# Patient Record
Sex: FEMALE | Race: WHITE | NOT HISPANIC OR LATINO | Employment: FULL TIME | URBAN - METROPOLITAN AREA
[De-identification: names, ages, dates, MRNs, and addresses within clinical notes are randomized per-mention and may not be internally consistent; named-entity substitution may affect disease eponyms.]

---

## 2017-09-15 ENCOUNTER — OFFICE VISIT (OUTPATIENT)
Dept: FAMILY MEDICINE | Facility: CLINIC | Age: 47
End: 2017-09-15
Payer: COMMERCIAL

## 2017-09-15 VITALS
TEMPERATURE: 98.6 F | WEIGHT: 184.4 LBS | HEART RATE: 80 BPM | BODY MASS INDEX: 29.63 KG/M2 | DIASTOLIC BLOOD PRESSURE: 80 MMHG | SYSTOLIC BLOOD PRESSURE: 120 MMHG | HEIGHT: 66 IN

## 2017-09-15 DIAGNOSIS — Z23 NEED FOR VACCINATION: ICD-10-CM

## 2017-09-15 DIAGNOSIS — Z23 NEED FOR PROPHYLACTIC VACCINATION AND INOCULATION AGAINST INFLUENZA: ICD-10-CM

## 2017-09-15 DIAGNOSIS — N95.1 PERIMENOPAUSE: Primary | ICD-10-CM

## 2017-09-15 DIAGNOSIS — Z13.6 CARDIOVASCULAR SCREENING; LDL GOAL LESS THAN 160: ICD-10-CM

## 2017-09-15 LAB
CHOLEST SERPL-MCNC: 160 MG/DL
HDLC SERPL-MCNC: 43 MG/DL
LDLC SERPL CALC-MCNC: 96 MG/DL
NONHDLC SERPL-MCNC: 117 MG/DL
TRIGL SERPL-MCNC: 107 MG/DL

## 2017-09-15 PROCEDURE — 36415 COLL VENOUS BLD VENIPUNCTURE: CPT | Performed by: FAMILY MEDICINE

## 2017-09-15 PROCEDURE — 90472 IMMUNIZATION ADMIN EACH ADD: CPT | Performed by: FAMILY MEDICINE

## 2017-09-15 PROCEDURE — 90715 TDAP VACCINE 7 YRS/> IM: CPT | Performed by: FAMILY MEDICINE

## 2017-09-15 PROCEDURE — 90471 IMMUNIZATION ADMIN: CPT | Performed by: FAMILY MEDICINE

## 2017-09-15 PROCEDURE — 99213 OFFICE O/P EST LOW 20 MIN: CPT | Mod: 25 | Performed by: FAMILY MEDICINE

## 2017-09-15 PROCEDURE — 80061 LIPID PANEL: CPT | Performed by: FAMILY MEDICINE

## 2017-09-15 PROCEDURE — 90686 IIV4 VACC NO PRSV 0.5 ML IM: CPT | Performed by: FAMILY MEDICINE

## 2017-09-15 ASSESSMENT — ANXIETY QUESTIONNAIRES
7. FEELING AFRAID AS IF SOMETHING AWFUL MIGHT HAPPEN: NOT AT ALL
1. FEELING NERVOUS, ANXIOUS, OR ON EDGE: NOT AT ALL
GAD7 TOTAL SCORE: 2
6. BECOMING EASILY ANNOYED OR IRRITABLE: SEVERAL DAYS
3. WORRYING TOO MUCH ABOUT DIFFERENT THINGS: SEVERAL DAYS
2. NOT BEING ABLE TO STOP OR CONTROL WORRYING: NOT AT ALL
5. BEING SO RESTLESS THAT IT IS HARD TO SIT STILL: NOT AT ALL

## 2017-09-15 ASSESSMENT — PATIENT HEALTH QUESTIONNAIRE - PHQ9
5. POOR APPETITE OR OVEREATING: NOT AT ALL
SUM OF ALL RESPONSES TO PHQ QUESTIONS 1-9: 2

## 2017-09-15 NOTE — LETTER
September 18, 2017      Kayley Godoy  [address] [facility] [place] [address]        Dear ,    We are writing to inform you of your test results.    Your cholesterol looks good.  Your HDL (good) cholesterol was mildly low.  The best way to increase this is with regular exercise.  We should check again in a few years.    Resulted Orders   Lipid panel reflex to direct LDL   Result Value Ref Range    Cholesterol 160 <200 mg/dL    Triglycerides 107 <150 mg/dL    HDL Cholesterol 43 (L) >49 mg/dL    LDL Cholesterol Calculated 96 <100 mg/dL      Comment:      Desirable:       <100 mg/dl    Non HDL Cholesterol 117 <130 mg/dL       If you have any questions or concerns, please call the clinic at the number listed above.       Sincerely,        Steph Pedraza, DO

## 2017-09-15 NOTE — PROGRESS NOTES
SUBJECTIVE:   Kayley Godoy is a 46 year old female who presents to clinic today for the following health issues:    * hot flashes  * mood swings  * irregular bleeding    Patient presents today to discuss symptoms of perimenopause. States she's been having symptoms for at least the last year. Symptoms consist of mood swings that seem to occur on a cyclic basis. At these times she notes that she is more emotional and irritable and sometimes tearful. She feels the symptoms last for approximately one week. She feels they are manageable now that she is more aware of them.    She has also been having hot flashes and night sweats. Symptoms seem to be more frequent at night but she feels she continues to get adequate sleep.    She is not had regular menses since her ablation in 2008. She has had periodic scans spotting most recently had one day of very light flow in August she is not had any vaginal bleeding since.    She presents to discuss today because she's not sure if she needs to be doing anything regarding the symptoms.    She currently feels symptoms are manageable and is not interested in medication.      Problem list and histories reviewed & adjusted, as indicated.  Additional history: as documented      Reviewed and updated as needed this visit by clinical staff  Tobacco  Allergies  Meds  Problems  Med Hx  Surg Hx  Fam Hx  Soc Hx        Reviewed and updated as needed this visit by Provider  Tobacco  Allergies  Meds  Problems  Med Hx  Surg Hx  Fam Hx  Soc Hx          ROS: Remainder of Constitutional, CV, Respiratory, GI,  negative with exception of that mentioned above    PE:  VS as above   Gen:  WN/WD/WH female in NAD   Psych: Alert and oriented times 3; coherent speech, normal   rate and volume, able to articulate logical thoughts, able   to abstract reason, no tangential thoughts, no hallucinations   or delusions  Her affect is bright and appropriate    A/p:      ICD-10-CM    1. Perimenopause  N95.1    2. CARDIOVASCULAR SCREENING; LDL GOAL LESS THAN 160 Z13.6 Lipid panel reflex to direct LDL     reviewed with patient option for managing vasomotor symptoms of menopause including combination hormone replacement therapy, herbal supplements, and alternative medications such as venlafaxine, paroxetine, and gabapentin.    Also reviewed need for follow-up bleeding increases significantly or become significantly more frequent    Patient chooses to observe symptoms for now and will follow-up as needed.    Patient Instructions   We talked about some options today for managing perimenopausal symptoms. Please let me know if you're interested in considering any of these options.    If you're bleeding increases or you experiencing frequent vaginal bleeding please let me know.  We updated your tetanus and flu shot in clinic today    I will let you know your cholesterol results when available    I will see you back in the spring for your physical

## 2017-09-15 NOTE — MR AVS SNAPSHOT
After Visit Summary   9/15/2017    Kayley Godoy    MRN: [id number]           Patient Information     Date Of Birth          1970        Visit Information        Provider Department      9/15/2017 8:40 AM Steph Pedraza DO [facility]        Today's Diagnoses     CARDIOVASCULAR SCREENING; LDL GOAL LESS THAN 160    -  1      Care Instructions    We talked about some options today for managing perimenopausal symptoms. Please let me know if you're interested in considering any of these options.    If you're bleeding increases or you experiencing frequent vaginal bleeding please let me know.  We updated your tetanus and flu shot in clinic today    I will let you know your cholesterol results when available    I will see you back in the spring for your physical            Follow-ups after your visit        Who to contact     Normal or non-critical lab and imaging results will be communicated to you by [facility], letter or phone within 4 business days after the clinic has received the results. If you do not hear from us within 7 days, please contact the clinic through [facility] or phone. If you have a critical or abnormal lab result, we will notify you by phone as soon as possible.  Submit refill requests through [facility] or call your pharmacy and they will forward the refill request to us. Please allow 3 business days for your refill to be completed.          If you need to speak with a  for additional information , please call: [phone]           Additional Information About Your Visit        [facility] Information     [facility] gives you secure access to your electronic health record. If you see a primary care provider, you can also send messages to your care team and make appointments. If you have questions, please call your primary care clinic.  If you do not have a primary care provider, please call [phone] and they will assist you.        Care EveryWhere ID      "This is your Care EveryWhere ID. This could be used by other organizations to access your [facility] medical records  [id number]        Your Vitals Were     Pulse Temperature Height Breastfeeding? BMI (Body Mass Index)       80 98.6  F (37  C) (Tympanic) 5' 5.75\" (1.67 m) No 29.99 kg/m2        Blood Pressure from Last 3 Encounters:   09/15/17 120/80   12/12/16 118/76   10/13/16 100/66    Weight from Last 3 Encounters:   09/15/17 184 lb 6.4 oz (83.6 kg)   12/12/16 183 lb (83 kg)   10/13/16 184 lb 6.4 oz (83.6 kg)              We Performed the Following     Lipid panel reflex to direct LDL        Primary Care Provider Office Phone # Fax #    Steph Priest DO [place] [phone] [address] [place] [address]        Equal Access to Services     SUSHANT SANTIZO : Hadii donna parr Sojoey, waaxda luqadaha, qaybta kaalmada adedixieyada, axel neville . So [facility] [phone].    ATENCIÓN: Si habla español, tiene a neil disposición servicios gratuitos de asistencia lingüística. Llame al [phone].    We comply with applicable federal civil rights laws and [place] laws. We do not discriminate on the basis of race, color, national origin, age, disability sex, sexual orientation or gender identity.            Thank you!     Thank you for choosing [facility] [place]  for your care. Our goal is always to provide you with excellent care. Hearing back from our patients is one way we can continue to improve our services. Please take a few minutes to complete the written survey that you may receive in the mail after your visit with us. Thank you!             Your Updated Medication List - Protect others around you: Learn how to safely use, store and throw away your medicines at www.disposemymeds.org.          This list is accurate as of: 9/15/17  9:09 AM.  Always use your most recent med list.                   Brand Name Dispense Instructions for use Diagnosis    ibuprofen " 200 MG tablet    ADVIL/MOTRIN     AS NEEDED        NO ACTIVE MEDICATIONS           omeprazole 20 MG tablet      Take 20 mg by mouth daily as needed

## 2017-09-15 NOTE — LETTER
My Depression Action Plan  Name: Kayley Godoy   Date of Birth 1970  Date: 9/15/2017    My doctor: Steph Pedraza   My clinic: [address] [address] [phone]          GREEN    ZONE   Good Control    What it looks like:     Things are going generally well. You have normal up s and down s. You may even feel depressed from time to time, but bad moods usually last less than a day.   What you need to do:  1. Continue to care for yourself (see self care plan)  2. Check your depression survival kit and update it as needed  3. Follow your physician s recommendations including any medication.  4. Do not stop taking medication unless you consult with your physician first.           YELLOW         ZONE Getting Worse    What it looks like:     Depression is starting to interfere with your life.     It may be hard to get out of bed; you may be starting to isolate yourself from others.    Symptoms of depression are starting to last most all day and this has happened for several days.     You may have suicidal thoughts but they are not constant.   What you need to do:     1. Call your care team, your response to treatment will improve if you keep your care team informed of your progress. Yellow periods are signs an adjustment may need to be made.     2. Continue your self-care, even if you have to fake it!    3. Talk to someone in your support network    4. Open up your depression survival kit           RED    ZONE Medical Alert - Get Help    What it looks like:     Depression is seriously interfering with your life.     You may experience these or other symptoms: You can t get out of bed most days, can t work or engage in other necessary activities, you have trouble taking care of basic hygiene, or basic responsibilities, thoughts of suicide or death that will not go away, self-injurious behavior.     What you need to do:  1. Call your care team and  request a same-day appointment. If they are not available (weekends or after hours) call your local crisis line, emergency room or 911.      Electronically signed by: Amanda Alexis, September 15, 2017    Depression Self Care Plan / Survival Kit    Self-Care for Depression  Here s the deal. Your body and mind are really not as separate as most people think.  What you do and think affects how you feel and how you feel influences what you do and think. This means if you do things that people who feel good do, it will help you feel better.  Sometimes this is all it takes.  There is also a place for medication and therapy depending on how severe your depression is, so be sure to consult with your medical provider and/ or Behavioral Health Consultant if your symptoms are worsening or not improving.     In order to better manage my stress, I will:    Exercise  Get some form of exercise, every day. This will help reduce pain and release endorphins, the  feel good  chemicals in your brain. This is almost as good as taking antidepressants!  This is not the same as joining a gym and then never going! (they count on that by the way ) It can be as simple as just going for a walk or doing some gardening, anything that will get you moving.      Hygiene   Maintain good hygiene (Get out of bed in the morning, Make your bed, Brush your teeth, Take a shower, and Get dressed like you were going to work, even if you are unemployed).  If your clothes don't fit try to get ones that do.    Diet  I will strive to eat foods that are good for me, drink plenty of water, and avoid excessive sugar, caffeine, alcohol, and other mood-altering substances.  Some foods that are helpful in depression are: complex carbohydrates, B vitamins, flaxseed, fish or fish oil, fresh fruits and vegetables.    Psychotherapy  I agree to participate in Individual Therapy (if recommended).    Medication  If prescribed medications, I agree to take them.  Missing  doses can result in serious side effects.  I understand that drinking alcohol, or other illicit drug use, may cause potential side effects.  I will not stop my medication abruptly without first discussing it with my provider.    Staying Connected With Others  I will stay in touch with my friends, family members, and my primary care provider/team.    Use your imagination  Be creative.  We all have a creative side; it doesn t matter if it s oil painting, sand castles, or mud pies! This will also kick up the endorphins.    Witness Beauty  (AKA stop and smell the roses) Take a look outside, even in mid-winter. Notice colors, textures. Watch the squirrels and birds.     Service to others  Be of service to others.  There is always someone else in need.  By helping others we can  get out of ourselves  and remember the really important things.  This also provides opportunities for practicing all the other parts of the program.    Humor  Laugh and be silly!  Adjust your TV habits for less news and crime-drama and more comedy.    Control your stress  Try breathing deep, massage therapy, biofeedback, and meditation. Find time to relax each day.     My support system    Clinic Contact:  Phone number:    Contact 1:  Phone number:    Contact 2:  Phone number:    M[employer]on/:  Phone number:    Therapist:  Phone number:    Local crisis center:    Phone number:    Other community support:  Phone number:

## 2017-09-15 NOTE — PATIENT INSTRUCTIONS
We talked about some options today for managing perimenopausal symptoms. Please let me know if you're interested in considering any of these options.    If you're bleeding increases or you experiencing frequent vaginal bleeding please let me know.  We updated your tetanus and flu shot in clinic today    I will let you know your cholesterol results when available    I will see you back in the spring for your physical

## 2017-09-15 NOTE — PROGRESS NOTES
Injectable Influenza Immunization Documentation    1.  Are you sick today? (Fever of 100.5 or higher on the day of the clinic)   No    2.  Have you ever had Guillain-Basco Syndrome within 6 weeks of an influenza vaccionation?  No    3. Do you have a life-threatening allergy to eggs?  No    4. Do you have a life-threatening allergy to a component of the vaccine? May include antibiotics, gelatin or latex.  No     5. Have you ever had a reaction to a dose of flu vaccine that needed immediate medical attention?  No     Form completed by Kayley Buckner CMA

## 2017-09-15 NOTE — NURSING NOTE
"Initial /80  Pulse 80  Temp 98.6  F (37  C) (Tympanic)  Ht 5' 5.75\" (1.67 m)  Wt 184 lb 6.4 oz (83.6 kg)  Breastfeeding? No  BMI 29.99 kg/m2 Estimated body mass index is 29.99 kg/(m^2) as calculated from the following:    Height as of this encounter: 5' 5.75\" (1.67 m).    Weight as of this encounter: 184 lb 6.4 oz (83.6 kg). .      "

## 2017-09-16 ASSESSMENT — ANXIETY QUESTIONNAIRES: GAD7 TOTAL SCORE: 2

## 2018-06-23 ENCOUNTER — APPOINTMENT (OUTPATIENT)
Dept: GENERAL RADIOLOGY | Facility: CLINIC | Age: 48
End: 2018-06-23
Attending: PHYSICIAN ASSISTANT
Payer: COMMERCIAL

## 2018-06-23 ENCOUNTER — HOSPITAL ENCOUNTER (EMERGENCY)
Facility: CLINIC | Age: 48
Discharge: HOME | End: 2018-06-23
Attending: PHYSICIAN ASSISTANT | Admitting: PHYSICIAN ASSISTANT
Payer: COMMERCIAL

## 2018-06-23 VITALS — TEMPERATURE: 98.5 F | DIASTOLIC BLOOD PRESSURE: 78 MMHG | OXYGEN SATURATION: 99 % | SYSTOLIC BLOOD PRESSURE: 128 MMHG

## 2018-06-23 DIAGNOSIS — S90.31XA CONTUSION OF RIGHT FOOT, INITIAL ENCOUNTER: ICD-10-CM

## 2018-06-23 PROCEDURE — 99213 OFFICE O/P EST LOW 20 MIN: CPT | Mod: Z6 | Performed by: PHYSICIAN ASSISTANT

## 2018-06-23 PROCEDURE — G0463 HOSPITAL OUTPT CLINIC VISIT: HCPCS | Performed by: PHYSICIAN ASSISTANT

## 2018-06-23 PROCEDURE — 73630 X-RAY EXAM OF FOOT: CPT | Mod: RT

## 2018-06-23 NOTE — DISCHARGE INSTRUCTIONS
Foot Contusion  You have a contusion. This is also called a bruise. There is swelling and some bleeding under the skin, but no broken bones. This injury generally takes a few days to a few weeks to heal.  During that time, the bruise will typically change in color from reddish, to purple-blue, to greenish-yellow, then to yellow-brown.  Home care    Elevate the foot to reduce pain and swelling. As much as possible, sit or lie down with the foot raised about the level of your heart. This is especially important during the first 48 hours.    Ice the foot to help reduce pain and swelling. Wrap a cold source (ice pack or ice cubes in a plastic bag) in a thin towel. Apply to the bruised area for 20 minutes every 1 to 2 hours the first day. Continue this 3 to 4 times a day until the pain and swelling goes away.    Unless another medicine was prescribed, you can take acetaminophen, ibuprofen, or naproxen to control pain. (If you have chronic liver or kidney disease or ever had a stomach ulcer or gastrointestinal bleeding, talk with your healthcare provider before using these medicines.)  Follow up  Follow up with your healthcare provider or our staff as advised. Call if you are not improving within 1 to 2 weeks.  When to seek medical advice   Call your healthcare provider right away if you have any of the following:    Increased pain or swelling    Foot or leg becomes cold, blue, numb or tingly    Signs of infection: Warmth, drainage, or increased redness or pain around the bruise    Inability to move the injured foot     Frequent bruising for unknown reasons  Date Last Reviewed: 2/1/2017 2000-2017 [employer]. [address] [place] [address], [place] [address]. All rights reserved. This information is not intended as a substitute for professional medical care. Always follow your healthcare professional's instructions.

## 2018-06-23 NOTE — ED AVS SNAPSHOT
[facility] Emergency Department    [address] [address]    Phone:  [phone]    Fax:  [phone]                                       Kayley Godoy   MRN: [id number]    Department:  [facility] Emergency Department   Date of Visit:  6/23/2018           After Visit Summary Signature Page     I have received my discharge instructions, and my questions have been answered. I have discussed any challenges I see with this plan with the nurse or doctor.    ..........................................................................................................................................  Patient/Patient Representative Signature      ..........................................................................................................................................  Patient Representative Print Name and Relationship to Patient    ..................................................               ................................................  Date                                            Time    ..........................................................................................................................................  Reviewed by Signature/Title    ...................................................              ..............................................  Date                                                            Time

## 2018-06-23 NOTE — ED PROVIDER NOTES
"  History     Chief Complaint   Patient presents with     Foot Pain     HPI  Kayley Godoy is a 47 year old female who is in the urgent care with right foot pain after injury earlier today.  Just prior to arrival patient was working in the yard wearing flip flops when she knocked over a small cement table which landed directly on her foot.  She had sudden onset of swelling, pain.  She additionally complains of intermittent paresthesias.  Does not have any other areas of discomfort.  No over overlying lacerations, abrasions or skin changes.  She has attempted to treat with ibuprofen without improvement.      Problem List:    Patient Active Problem List    Diagnosis Date Noted     Dyspepsia 03/23/2016     Priority: Medium     Dyspepsia and other specified disorders of function of stomach 09/02/2014     Priority: Medium     Anxiety 03/01/2012     Priority: Medium     24 hour contact handout given 02/13/2012     Priority: Medium     EMERGENCY CARE PLAN  Presenting Problem Signs and Symptoms Treatment Plan    Questions or conerns during clinic hours    I will call the clinic directly     Questions or conerns outside clinic hours    I will call the 24 hour nurse line at [phone]    Patient needs to schedule an appointment    I will call the 24 hour scheduling team at [phone] or clinic directly    Same day treatment     I will call the clinic first, nurse line if after hours, urgent care and express care if needed                                 Dysthymia 02/13/2012     Priority: Medium     ANAND 3 05/19/2011     Priority: Medium     Distant h/o abnormal pap, around age 18.  Had treatment with \"laser\".  Believes it to have been a CIN3 lesion.  Had repeat biopsies and all normal follow up  4/29/16: Pap - ASCUS, Neg HPV. Plan cotest in 3 years.        CARDIOVASCULAR SCREENING; LDL GOAL LESS THAN 160 10/31/2010     Priority: Medium     Intramural leiomyoma of uterus 01/08/2008     Priority: Medium     ATYPICAL " "MELANOCYTIC NEVUS 03/19/2005     Priority: Medium     3/19/05 Changing mole left shoulder. Biopsied by Dr. Mesa @ [facility].  4/4/05 Dr. Degroot @ [facility]: returning for removal of remnants of lesion. Prev path showed atypical melanocytic nevus.   4/6/05 Path shows no residual atypical melanocyte proliferation.        BILATERAL KNEE PAIN 02/04/2005     Priority: Medium     1/13/05 Dr. Akbar @ [facility]: Duration 4m. Plan MRI.  2/4/05 Dr. Mesa: Daily pain, R>L, feels hot to touch. Prev MRI shows some early meniscal changes in both knees, medical meniscus posterior horn. Advised glucosamine/ chondroitin.       Decreased libido 02/04/2005     Priority: Medium     2/4/05 Dr. Mesa: advised try to incr frequency of intimacies to incr receptiveness.          Past Medical History:    Past Medical History:   Diagnosis Date     Abnormal Pap smear of cervix \"In her 20's\"     ASCUS favor benign 4/29/16     Past Surgical History:    Past Surgical History:   Procedure Laterality Date     C APPENDECTOMY  age 13     CL AFF SURGICAL PATHOLOGY  1993     SURGICAL HISTORY OF -   2005    mole removal left upper chest     SURGICAL HISTORY OF -   2008    endometrial ablation     Family History:    Family History   Problem Relation Age of Onset     Cerebrovascular Disease Mother      Diabetes Father      C.A.D. Father      stents at age 70     Hypertension No family hx of      Breast Cancer No family hx of      Cancer - colorectal No family hx of      Social History:  Marital Status:   [2]  Social History   Substance Use Topics     Smoking status: Former Smoker     Packs/day: 0.50     Years: 15.00     Types: Cigarettes     Smokeless tobacco: Never Used     Alcohol use Yes      Comment: rarely      Medications:      IBUPROFEN 200 MG OR TABS   NO ACTIVE MEDICATIONS   omeprazole 20 MG tablet     Review of Systems  INTEGUMENTARY/SKIN: POSITIVE for ecchymosis NEGATIVE for lacerations, abrasions or rashes "   MUSCULOSKELETAL: POSITIVE  for right foot pain and swelling and NEGATIVE for other concerning arthralgias or myalgias   NEURO: POSITIVE for intermittent paresthesias and NEGATIVE for numbness  Physical Exam   BP: 128/78  Heart Rate: 68  Temp: 98.5  F (36.9  C)  SpO2: 99 %  Physical Exam   Constitutional: She is oriented to person, place, and time. She appears well-developed and well-nourished. No distress.   Cardiovascular:   Pulses:       Dorsalis pedis pulses are 2+ on the right side        Posterior tibial pulses are 2+ on the right side   Musculoskeletal:        Right ankle: Normal.        Right foot: There is decreased range of motion (actively due to discomfort), tenderness, bony tenderness and swelling. There is normal capillary refill, no crepitus, no deformity and no laceration.        Feet:    Neurological: She is alert and oriented to person, place, and time. No sensory deficit.   Skin: Skin is warm and dry. Ecchymosis noted. No rash noted. No erythema.       ED Course     ED Course     Procedures        Critical Care time:  none            Results for orders placed or performed during the hospital encounter of 06/23/18 (from the past 24 hour(s))   Foot  XR, G/E 3 views, right    Narrative    FOOT THREE VIEWS RIGHT  6/23/2018 2:28 PM     HISTORY: pain after cement table fell on;     COMPARISON: None.    FINDINGS:There is normal osseous alignment.  No fractures are  identified.  There is significant soft tissue swelling of the dorsum  foot.      Impression    IMPRESSION:   1. No fractures are identified.  2. Soft tissue swelling over the dorsum of the foot.    MIRYAM ROMERO MD       Medications - No data to display    Assessments & Plan (with Medical Decision Making)     I have reviewed the nursing notes.    I have reviewed the findings, diagnosis, plan and need for follow up with the patient.       Discharge Medication List as of 6/23/2018  2:43 PM        Final diagnoses:   Contusion of right foot, initial  encounter     47-year-old female presents to the urgent care with concern over right foot pain after injury earlier today when he seen a table fell on her foot.  She had stable vital signs upon arrival.  Physical exam findings as described above.  As part of evaluation patient had x-ray of her right foot which is negative for acute fracture, dislocation, only noted over the dorsum of her right foot.  Symptoms most consistent with right foot contusion, hematoma.  Low suspicion for sprain/strain or occult fracture.  Patient was discharged in stable postop shoe.  She was instructed to treat, Tylenol/ibuprofen as needed for pain.  Follow-up with primary care provider if no improvement within the next 5-7 days.  Worrisome reasons to return to the ER/UC sooner discussed.    Disclaimer: This note consists of symbols derived from keyboarding, dictation, and/or voice recognition software. As a result, there may be errors in the script that have gone undetected.  Please consider this when interpreting information found in the chart.      6/23/2018   [facility] EMERGENCY DEPARTMENT     Stephanie Dixon PA-C  06/26/18 [id number]     no known mental health issues.

## 2018-06-23 NOTE — ED AVS SNAPSHOT
[facility] Emergency Department    [address] [address]    Phone:  [phone]    Fax:  [phone]                                       Kayley Godoy   MRN: [id number]    Department:  [facility] Emergency Department   Date of Visit:  6/23/2018           Patient Information     Date Of Birth          1970        Your diagnoses for this visit were:     Contusion of right foot, initial encounter        You were seen by Stephanie Dixon PA-C.      Follow-up Information     Follow up with Steph Pedraza DO In 3 days.    Specialties:  Family Practice, Urgent Care    Why:  if no improvement or sooner if new or worsening symptoms     Contact information:    [address]   [place] [address]  [phone]          Discharge Instructions         Foot Contusion  You have a contusion. This is also called a bruise. There is swelling and some bleeding under the skin, but no broken bones. This injury generally takes a few days to a few weeks to heal.  During that time, the bruise will typically change in color from reddish, to purple-blue, to greenish-yellow, then to yellow-brown.  Home care    Elevate the foot to reduce pain and swelling. As much as possible, sit or lie down with the foot raised about the level of your heart. This is especially important during the first 48 hours.    Ice the foot to help reduce pain and swelling. Wrap a cold source (ice pack or ice cubes in a plastic bag) in a thin towel. Apply to the bruised area for 20 minutes every 1 to 2 hours the first day. Continue this 3 to 4 times a day until the pain and swelling goes away.    Unless another medicine was prescribed, you can take acetaminophen, ibuprofen, or naproxen to control pain. (If you have chronic liver or kidney disease or ever had a stomach ulcer or gastrointestinal bleeding, talk with your healthcare provider before using these medicines.)  Follow up  Follow up with your healthcare provider or our  staff as advised. Call if you are not improving within 1 to 2 weeks.  When to seek medical advice   Call your healthcare provider right away if you have any of the following:    Increased pain or swelling    Foot or leg becomes cold, blue, numb or tingly    Signs of infection: Warmth, drainage, or increased redness or pain around the bruise    Inability to move the injured foot     Frequent bruising for unknown reasons  Date Last Reviewed: 2/1/2017 2000-2017 [employer]. [address] [place], [place] [address]. All rights reserved. This information is not intended as a substitute for professional medical care. Always follow your healthcare professional's instructions.          24 Hour Appointment Hotline       To make an appointment at any [facility], call [phone] ([phone]). If you don't have a family doctor or clinic, we will help you find one. [facility] clinics are conveniently located to serve the needs of you and your family.          ED Discharge Orders     Post-Op Shoe                    Review of your medicines      Our records show that you are taking the medicines listed below. If these are incorrect, please call your family doctor or clinic.        Dose / Directions Last dose taken    ibuprofen 200 MG tablet   Commonly known as:  ADVIL/MOTRIN        AS NEEDED   Refills:  0        NO ACTIVE MEDICATIONS        Refills:  0        omeprazole 20 MG tablet   Dose:  20 mg        Take 20 mg by mouth daily as needed   Refills:  0                Procedures and tests performed during your visit     Foot  XR, G/E 3 views, right      Orders Needing Specimen Collection     None      Pending Results     Date and Time Order Name Status Description    6/23/2018 1419 Foot  XR, G/E 3 views, right Preliminary             Pending Culture Results     No orders found from 6/21/2018 to 6/24/2018.            Pending Results Instructions     If you had any lab results that were not finalized at  the time of your Discharge, you can call the ED Lab Result RN at [phone]. You will be contacted by this team for any positive Lab results or changes in treatment. The nurses are available 7 days a week from 10A to 6:30P.  You can leave a message 24 hours per day and they will return your call.        Test Results From Your Hospital Stay        6/23/2018  2:36 PM      Narrative     FOOT THREE VIEWS RIGHT  6/23/2018 2:28 PM     HISTORY: pain after cement table fell on;     COMPARISON: None.    FINDINGS:There is normal osseous alignment.  No fractures are  identified.  There is significant soft tissue swelling of the dorsum  foot.        Impression     IMPRESSION:   1. No fractures are identified.  2. Soft tissue swelling over the dorsum of the foot.                Thank you for choosing [facility]       Thank you for choosing [facility] for your care. Our goal is always to provide you with excellent care. Hearing back from our patients is one way we can continue to improve our services. Please take a few minutes to complete the written survey that you may receive in the mail after you visit with us. Thank you!        [email or web address] Information     [facility] gives you secure access to your electronic health record. If you see a primary care provider, you can also send messages to your care team and make appointments. If you have questions, please call your primary care clinic.  If you do not have a primary care provider, please call [phone] and they will assist you.        Care EveryWhere ID     This is your Care EveryWhere ID. This could be used by other organizations to access your [facility] medical records  [id number]        Equal Access to Services     CHARLY SANTIZO : Walker Goetz, gualberto smith, axel whitaker. So [facility] [phone].    ATENCIÓN: Si habla español, tiene a neil disposición servicios gratuitos de asistencia lingüística. Llame al  [phone].    We comply with applicable federal civil rights laws and [place] laws. We do not discriminate on the basis of race, color, national origin, age, disability, sex, sexual orientation, or gender identity.            After Visit Summary       This is your record. Keep this with you and show to your community pharmacist(s) and doctor(s) at your next visit.

## 2019-04-08 ENCOUNTER — OFFICE VISIT (OUTPATIENT)
Dept: FAMILY MEDICINE | Facility: CLINIC | Age: 49
End: 2019-04-08
Payer: COMMERCIAL

## 2019-04-08 VITALS
HEART RATE: 72 BPM | BODY MASS INDEX: 30.44 KG/M2 | TEMPERATURE: 98.1 F | DIASTOLIC BLOOD PRESSURE: 76 MMHG | SYSTOLIC BLOOD PRESSURE: 130 MMHG | HEIGHT: 66 IN | WEIGHT: 189.4 LBS

## 2019-04-08 DIAGNOSIS — M79.675 PAIN OF TOE OF LEFT FOOT: Primary | ICD-10-CM

## 2019-04-08 PROCEDURE — 99214 OFFICE O/P EST MOD 30 MIN: CPT | Performed by: PHYSICIAN ASSISTANT

## 2019-04-08 ASSESSMENT — PAIN SCALES - GENERAL: PAINLEVEL: MILD PAIN (2)

## 2019-04-08 ASSESSMENT — MIFFLIN-ST. JEOR: SCORE: 1487.92

## 2019-04-08 NOTE — PATIENT INSTRUCTIONS
I will have you use ibuprofen 600 mg 3x daily with food. I will have you wear shoes with plenty of room in the toe box and if pain or numbness is persisting I would have you see the foot specialist for further evaluation.

## 2019-04-08 NOTE — NURSING NOTE
"Initial /76 (BP Location: Right arm, Patient Position: Chair, Cuff Size: Adult Regular)   Pulse 72   Temp 98.1  F (36.7  C) (Tympanic)   Ht 1.664 m (5' 5.5\")   Wt 85.9 kg (189 lb 6.4 oz)   BMI 31.04 kg/m   Estimated body mass index is 31.04 kg/m  as calculated from the following:    Height as of this encounter: 1.664 m (5' 5.5\").    Weight as of this encounter: 85.9 kg (189 lb 6.4 oz). .    Amanda Smith CMA ([facility])    "

## 2019-04-08 NOTE — PROGRESS NOTES
"  SUBJECTIVE:                                                    Kayley Godoy is a 48 year old female who presents to clinic today for the following health issues:      Toe Pain    Onset: 3 weeks    Description:   Location: Top of the left great toe, now hurts in the joint as well    Character: Top of the top is numb, feels \"dead\" then the joint started to get painful this weekend     Intensity: moderate    Progression of Symptoms: worse    Accompanying Signs & Symptoms:  Other symptoms: swelling, pain to the touch    History:   Previous similar pain: no    Precipitating factors:   Trauma or overuse: no, but does recall sitting on the touch and feeling like her foot fell asleep and has not woken up since then over the weekend did drive for a long time over the weekend and feels that this had exacerbated the issue.     Alleviating factors:  Improved by: rest/inactivity    Therapies Tried and outcome: Ibuprofen - was able to get some sleep over the weekend.    Patient has been having numbness and tingling to the left great toe and in the last few days has developed pain to the base of the toe. She denies any injury to the area, has not had redness or noted any swelling to the area.     -------------------------------------    Problem list and histories reviewed & adjusted, as indicated.  Additional history: as documented    BP Readings from Last 3 Encounters:   04/08/19 130/76   06/23/18 128/78   09/15/17 120/80    Wt Readings from Last 3 Encounters:   04/08/19 85.9 kg (189 lb 6.4 oz)   09/15/17 83.6 kg (184 lb 6.4 oz)   12/12/16 83 kg (183 lb)         ROS:  Constitutional, HEENT, cardiovascular, pulmonary, gi and gu systems are negative, except as otherwise noted.    OBJECTIVE:     /76 (BP Location: Right arm, Patient Position: Chair, Cuff Size: Adult Regular)   Pulse 72   Temp 98.1  F (36.7  C) (Tympanic)   Ht 1.664 m (5' 5.5\")   Wt 85.9 kg (189 lb 6.4 oz)   BMI 31.04 kg/m    Body mass index is 31.04 " kg/m .  GENERAL: healthy, alert and no distress  RESP: lungs clear to auscultation - no rales, rhonchi or wheezes  MS: tenderness to palpation of the base of the left great toe as well as increased pain with extension of the toe. No noted swelling or deformity  SKIN: no skin rashes or redness noted    Diagnostic Test Results:  none     ASSESSMENT/PLAN:       ICD-10-CM    1. Pain of toe of left foot M79.675 PODIATRY/FOOT & ANKLE SURGERY REFERRAL       I will treat for inflammation and refer patient to podiatry for further evaluation of persisting symptoms.   See Patient Instructions    Cindy Baez PA-C  [facility]

## 2019-04-26 ENCOUNTER — OFFICE VISIT (OUTPATIENT)
Dept: FAMILY MEDICINE | Facility: CLINIC | Age: 49
End: 2019-04-26
Payer: COMMERCIAL

## 2019-04-26 ENCOUNTER — OFFICE VISIT (OUTPATIENT)
Dept: PODIATRY | Facility: CLINIC | Age: 49
End: 2019-04-26
Attending: PHYSICIAN ASSISTANT
Payer: COMMERCIAL

## 2019-04-26 VITALS
HEIGHT: 66 IN | WEIGHT: 184.4 LBS | HEART RATE: 68 BPM | BODY MASS INDEX: 29.63 KG/M2 | RESPIRATION RATE: 12 BRPM | SYSTOLIC BLOOD PRESSURE: 120 MMHG | TEMPERATURE: 97.8 F | DIASTOLIC BLOOD PRESSURE: 70 MMHG

## 2019-04-26 VITALS
HEART RATE: 71 BPM | DIASTOLIC BLOOD PRESSURE: 70 MMHG | WEIGHT: 184 LBS | HEIGHT: 66 IN | BODY MASS INDEX: 29.57 KG/M2 | SYSTOLIC BLOOD PRESSURE: 114 MMHG

## 2019-04-26 DIAGNOSIS — Z12.4 SCREENING FOR CERVICAL CANCER: ICD-10-CM

## 2019-04-26 DIAGNOSIS — N95.1 PERIMENOPAUSE: Primary | ICD-10-CM

## 2019-04-26 DIAGNOSIS — Z12.11 SPECIAL SCREENING FOR MALIGNANT NEOPLASMS, COLON: ICD-10-CM

## 2019-04-26 DIAGNOSIS — G57.92 NEURITIS OF LEFT FOOT: Primary | ICD-10-CM

## 2019-04-26 DIAGNOSIS — Z11.51 SPECIAL SCREENING EXAMINATION FOR HUMAN PAPILLOMAVIRUS (HPV): ICD-10-CM

## 2019-04-26 DIAGNOSIS — Z12.31 ENCOUNTER FOR SCREENING MAMMOGRAM FOR BREAST CANCER: ICD-10-CM

## 2019-04-26 PROCEDURE — G0145 SCR C/V CYTO,THINLAYER,RESCR: HCPCS | Performed by: FAMILY MEDICINE

## 2019-04-26 PROCEDURE — 99214 OFFICE O/P EST MOD 30 MIN: CPT | Performed by: FAMILY MEDICINE

## 2019-04-26 PROCEDURE — 99203 OFFICE O/P NEW LOW 30 MIN: CPT | Performed by: PODIATRIST

## 2019-04-26 PROCEDURE — 87624 HPV HI-RISK TYP POOLED RSLT: CPT | Performed by: FAMILY MEDICINE

## 2019-04-26 ASSESSMENT — ANXIETY QUESTIONNAIRES
3. WORRYING TOO MUCH ABOUT DIFFERENT THINGS: NOT AT ALL
5. BEING SO RESTLESS THAT IT IS HARD TO SIT STILL: NOT AT ALL
7. FEELING AFRAID AS IF SOMETHING AWFUL MIGHT HAPPEN: NOT AT ALL
1. FEELING NERVOUS, ANXIOUS, OR ON EDGE: NOT AT ALL
6. BECOMING EASILY ANNOYED OR IRRITABLE: NOT AT ALL
2. NOT BEING ABLE TO STOP OR CONTROL WORRYING: NOT AT ALL
GAD7 TOTAL SCORE: 0

## 2019-04-26 ASSESSMENT — PATIENT HEALTH QUESTIONNAIRE - PHQ9
SUM OF ALL RESPONSES TO PHQ QUESTIONS 1-9: 0
5. POOR APPETITE OR OVEREATING: NOT AT ALL

## 2019-04-26 ASSESSMENT — MIFFLIN-ST. JEOR
SCORE: 1471.37
SCORE: 1473.18

## 2019-04-26 NOTE — LETTER
"    4/26/2019         RE: Kayley Godoy  [address] [address]        Dear Colleague,    Thank you for referring your patient, Kayley Godoy, to the [facility]. Please see a copy of my visit note below.    PATIENT HISTORY:  Kayley Godoy is a 48 year old female who presents to clinic for a painful left foot .  The patient describes the pain as burning and tingling.  The patient relates the pain level is moderate.  The patient relates pain is located over the big toe  on the left.  The patient relates the pain has been present for the past several weeks.  The patient relates pain with ambulation.  The patient has tried different shoes with little relief.  The patient was sent by Cindy Baez PA-C for consultation on the left foot.       REVIEW OF SYSTEMS:  Constitutional, HEENT, cardiovascular, pulmonary, GI, , musculoskeletal, neuro, skin, endocrine and psych systems are negative, except as otherwise noted.     PAST MEDICAL HISTORY:   Past Medical History:   Diagnosis Date     Abnormal Pap smear of cervix \"In her 20's\"    Had treatment with \"laser\".  Believes it to have been a CIN3 lesion.  Had repeat biopsies and all normal follow up     ASCUS favor benign 4/29/16    Neg HPV        PAST SURGICAL HISTORY:   Past Surgical History:   Procedure Laterality Date     C APPENDECTOMY  age 13     CL AFF SURGICAL PATHOLOGY  1993     SURGICAL HISTORY OF -   2005    mole removal left upper chest     SURGICAL HISTORY OF -   2008    endometrial ablation        MEDICATIONS:   Current Outpatient Medications:      IBUPROFEN 200 MG OR TABS, AS NEEDED, Disp: , Rfl:      MAGNESIUM PO, Take by mouth daily as needed, Disp: , Rfl:      ALLERGIES:    Allergies   Allergen Reactions     Wellbutrin [Bupropion Hydrobromide] Itching        SOCIAL HISTORY:   Social History     Socioeconomic History     Marital status:      Spouse name: Not on file     Number of children: Not on file     Years of " "education: Not on file     Highest education level: Not on file   Occupational History     Not on file   Social Needs     Financial resource strain: Not on file     Food insecurity:     Worry: Not on file     Inability: Not on file     Transportation needs:     Medical: Not on file     Non-medical: Not on file   Tobacco Use     Smoking status: Former Smoker     Packs/day: 0.50     Years: 15.00     Pack years: 7.50     Types: Cigarettes     Smokeless tobacco: Never Used   Substance and Sexual Activity     Alcohol use: Yes     Comment: rarely     Drug use: No     Sexual activity: Yes     Partners: Male     Birth control/protection: Surgical     Comment: vasectomy   Lifestyle     Physical activity:     Days per week: Not on file     Minutes per session: Not on file     Stress: Not on file   Relationships     Social connections:     Talks on phone: Not on file     Gets together: Not on file     Attends [employer] service: Not on file     Active member of club or organization: Not on file     Attends meetings of clubs or organizations: Not on file     Relationship status: Not on file     Intimate partner violence:     Fear of current or ex partner: Not on file     Emotionally abused: Not on file     Physically abused: Not on file     Forced sexual activity: Not on file   Other Topics Concern     Parent/sibling w/ CABG, MI or angioplasty before 65F 55M? No   Social History Narrative     Not on file        FAMILY HISTORY:   Family History   Problem Relation Age of Onset     Cerebrovascular Disease Mother      Diabetes Father      C.A.D. Father         stents at age 70     Hypertension No family hx of      Breast Cancer No family hx of      Cancer - colorectal No family hx of         EXAM:Vitals: /70   Pulse 71   Ht 1.676 m (5' 6\")   Wt 83.5 kg (184 lb)   BMI 29.70 kg/m     BMI= Body mass index is 29.7 kg/m .    Weight management plan: Patient was referred to their PCP to discuss a diet and exercise " plan.    General appearance: Patient is alert and fully cooperative with history & exam.  No sign of distress is noted during the visit.     Psychiatric: Affect is pleasant & appropriate.  Patient appears motivated to improve health.     Respiratory: Breathing is regular & unlabored while sitting.     HEENT: Hearing is intact to spoken word.  Speech is clear.  No gross evidence of visual impairment that would impact ambulation.     Dermatologic: Skin is intact to both lower extremities without significant lesions, rash or abrasion.  No paronychia or evidence of soft tissue infection is noted.     Vascular: DP & PT pulses are intact & regular bilaterally.  No significant edema or varicosities noted.  CFT and skin temperature is normal to both lower extremities.     Neurologic: Lower extremity sensation is intact to light touch.  No evidence of weakness or contracture in the lower extremities.  No evidence of neuropathy.  Noted positive Tinel sign over the first proper dorsal digital nerve overlying the metatarsophalangeal joint on the left.     Musculoskeletal: Patient is ambulatory without assistive device or brace.  No gross ankle deformity noted.  No foot or ankle joint effusion is noted.          ASSESSMENT / PLAN:     ICD-10-CM    1. Neuritis of left foot G57.92        I have explained to Kayley  about the conditions.  We discussed the nature of the condition as well as the treatment plan and expected length of recovery.  At this time, the patient was advised to further offload the area over the big toe joint on the left foot to better offload the nerve from her shoe gear.  The patient was informed that the healing process will take time.  The patient was advised to return to the office if symptoms persist.      Disclaimer: This note consists of symbols derived from keyboarding, dictation and/or voice recognition software. As a result, there may be errors in the script that have gone undetected. Please consider  this when interpreting information found in this chart.       LAVELLE Lew D.P.M., F.ISA.C.F.A.S.        Again, thank you for allowing me to participate in the care of your patient.        Sincerely,        Rashaun Lew DPM

## 2019-04-26 NOTE — PROGRESS NOTES
"PATIENT HISTORY:  Kayley Godoy is a 48 year old female who presents to clinic for a painful left foot .  The patient describes the pain as burning and tingling.  The patient relates the pain level is moderate.  The patient relates pain is located over the big toe  on the left.  The patient relates the pain has been present for the past several weeks.  The patient relates pain with ambulation.  The patient has tried different shoes with little relief.  The patient was sent by Cindy Baez PA-C for consultation on the left foot.       REVIEW OF SYSTEMS:  Constitutional, HEENT, cardiovascular, pulmonary, GI, , musculoskeletal, neuro, skin, endocrine and psych systems are negative, except as otherwise noted.     PAST MEDICAL HISTORY:   Past Medical History:   Diagnosis Date     Abnormal Pap smear of cervix \"In her 20's\"    Had treatment with \"laser\".  Believes it to have been a CIN3 lesion.  Had repeat biopsies and all normal follow up     ASCUS favor benign 4/29/16    Neg HPV        PAST SURGICAL HISTORY:   Past Surgical History:   Procedure Laterality Date     C APPENDECTOMY  age 13     CL AFF SURGICAL PATHOLOGY  1993     SURGICAL HISTORY OF -   2005    mole removal left upper chest     SURGICAL HISTORY OF -   2008    endometrial ablation        MEDICATIONS:   Current Outpatient Medications:      IBUPROFEN 200 MG OR TABS, AS NEEDED, Disp: , Rfl:      MAGNESIUM PO, Take by mouth daily as needed, Disp: , Rfl:      ALLERGIES:    Allergies   Allergen Reactions     Wellbutrin [Bupropion Hydrobromide] Itching        SOCIAL HISTORY:   Social History     Socioeconomic History     Marital status:      Spouse name: Not on file     Number of children: Not on file     Years of education: Not on file     Highest education level: Not on file   Occupational History     Not on file   Social Needs     Financial resource strain: Not on file     Food insecurity:     Worry: Not on file     Inability: Not on file     " "Transportation needs:     Medical: Not on file     Non-medical: Not on file   Tobacco Use     Smoking status: Former Smoker     Packs/day: 0.50     Years: 15.00     Pack years: 7.50     Types: Cigarettes     Smokeless tobacco: Never Used   Substance and Sexual Activity     Alcohol use: Yes     Comment: rarely     Drug use: No     Sexual activity: Yes     Partners: Male     Birth control/protection: Surgical     Comment: vasectomy   Lifestyle     Physical activity:     Days per week: Not on file     Minutes per session: Not on file     Stress: Not on file   Relationships     Social connections:     Talks on phone: Not on file     Gets together: Not on file     Attends Yazidism service: Not on file     Active member of club or organization: Not on file     Attends meetings of clubs or organizations: Not on file     Relationship status: Not on file     Intimate partner violence:     Fear of current or ex partner: Not on file     Emotionally abused: Not on file     Physically abused: Not on file     Forced sexual activity: Not on file   Other Topics Concern     Parent/sibling w/ CABG, MI or angioplasty before 65F 55M? No   Social History Narrative     Not on file        FAMILY HISTORY:   Family History   Problem Relation Age of Onset     Cerebrovascular Disease Mother      Diabetes Father      C.A.D. Father         stents at age 70     Hypertension No family hx of      Breast Cancer No family hx of      Cancer - colorectal No family hx of         EXAM:Vitals: /70   Pulse 71   Ht 1.676 m (5' 6\")   Wt 83.5 kg (184 lb)   BMI 29.70 kg/m    BMI= Body mass index is 29.7 kg/m .    Weight management plan: Patient was referred to their PCP to discuss a diet and exercise plan.    General appearance: Patient is alert and fully cooperative with history & exam.  No sign of distress is noted during the visit.     Psychiatric: Affect is pleasant & appropriate.  Patient appears motivated to improve health.     Respiratory: " Breathing is regular & unlabored while sitting.     HEENT: Hearing is intact to spoken word.  Speech is clear.  No gross evidence of visual impairment that would impact ambulation.     Dermatologic: Skin is intact to both lower extremities without significant lesions, rash or abrasion.  No paronychia or evidence of soft tissue infection is noted.     Vascular: DP & PT pulses are intact & regular bilaterally.  No significant edema or varicosities noted.  CFT and skin temperature is normal to both lower extremities.     Neurologic: Lower extremity sensation is intact to light touch.  No evidence of weakness or contracture in the lower extremities.  No evidence of neuropathy.  Noted positive Tinel sign over the first proper dorsal digital nerve overlying the metatarsophalangeal joint on the left.     Musculoskeletal: Patient is ambulatory without assistive device or brace.  No gross ankle deformity noted.  No foot or ankle joint effusion is noted.          ASSESSMENT / PLAN:     ICD-10-CM    1. Neuritis of left foot G57.92        I have explained to Kayley  about the conditions.  We discussed the nature of the condition as well as the treatment plan and expected length of recovery.  At this time, the patient was advised to further offload the area over the big toe joint on the left foot to better offload the nerve from her shoe gear.  The patient was informed that the healing process will take time.  The patient was advised to return to the office if symptoms persist.      Disclaimer: This note consists of symbols derived from keyboarding, dictation and/or voice recognition software. As a result, there may be errors in the script that have gone undetected. Please consider this when interpreting information found in this chart.       LAVELLE Lew D.P.M., [email or web address].

## 2019-04-26 NOTE — PROGRESS NOTES
"  SUBJECTIVE:   Kayley oGdoy is a 48 year old female who presents to clinic today for the following   health issues:    * discuss menopause symptoms - vaginal dryness, hot flashes    Cannot recall last menses.  Did have some bleeding after her ablation but none recently that she can recall.    Vaginal dryness has been the biggest issue.  Has really only been a problem with intercourse.  No symptoms outside of intercourse.  Tried a \"liquid bead\" for lubricant for intercourse.  Did not like this because it requries insertion 30 minutes prior to intercourse and then \"seemed like too much\".  Has not tried any other lubricants.  No bothersome vaginal symptoms outside of intercourse.    Having hot flashes regularly but are tolerable.  Having some night sweats but feels these are managable.  Just removes her covers and is able to go back to sleep.    Additional history: as documented    Reviewed  and updated as needed this visit by clinical staff  Tobacco  Allergies  Meds  Med Hx  Surg Hx  Fam Hx  Soc Hx        Reviewed and updated as needed this visit by Provider  Tobacco  Meds  Med Hx  Surg Hx  Fam Hx  Soc Hx        ROS: Remainder of Constitutional, CV, Respiratory, GI,  negative with exception of that mentioned above    PE:  VS as above   Gen:  WN/WD/WH female in NAD   Heart:  RRR without murmur, nl S1, S2, no rubs or gallops   Lungs CTA boogie without rales/ronchi/wheezes   Abd: soft, postive bowel sounds, NT/ND, no HSM, no rebounding/guarding/ridigity   :  normal appearing external female gentalia without lesions, cervix appears wnl, no lesions.  Mild vaginal mucosal atrophy noted.  Bimanual exam reveals normally shaped and sized uterus, no cervical motion or adnexal tenderness   Ext:  No pedal edema    A/p:      ICD-10-CM    1. Perimenopause N95.1    2. Screening for cervical cancer Z12.4 Pap imaged thin layer screen with HPV - recommended age 30 - 65 years (select HPV order below)   3. Special screening " examination for human papillomavirus (HPV) Z11.51 HPV High Risk Types DNA Cervical   4. Special screening for malignant neoplasms, colon Z12.11 GASTROENTEROLOGY ADULT REF PROCEDURE ONLY [facility] [phone]; [place] [facility]   5. Encounter for screening mammogram for breast cancer Z12.31 MA Screening Digital Bilateral     Patient Instructions   We'll start with some over the counter lubricant intended for intercourse.  You can also check out the store [employer] located in [facility] which has a lot of different options available and good information on dealing with vaginal dryness.    If you decide you'd like to try one of the vaginal estrogens we discussed just let me know.    Please call [phone] to schedule your mammogram.    Please also schedule the colonoscopy when you are able.    Reviewed options for vaginal menopausal symptoms.  Begin with over the counter lubricant.  Reviewed some options available as an alternative to the product she already tried.  Reviewed vaginal estrogens as an option as well if lubricants were not adequate.  Reviewed Estring, vaginal tablets and vaginal creams.  Pt will follow up with results and additional questions as above.    Reviewed screening.  Pap updated today.  Mammogram and colon screening as ordered.    20 minutes of 30 minute visit spent reviewing menopause, physiologic changes and management options as above.

## 2019-04-26 NOTE — PATIENT INSTRUCTIONS
We'll start with some over the counter lubricant intended for intercourse.  You can also check out the store [employer] located in [facility] which has a lot of different options available and good information on dealing with vaginal dryness.    If you decide you'd like to try one of the vaginal estrogens we discussed just let me know.    Please call [phone] to schedule your mammogram.    Please also schedule the colonoscopy when you are able.

## 2019-04-26 NOTE — NURSING NOTE
"Chief Complaint   Patient presents with     Consult     Left great toe is knumb for a month       Initial /70   Pulse 71   Ht 1.676 m (5' 6\")   Wt 83.5 kg (184 lb)   BMI 29.70 kg/m   Estimated body mass index is 29.7 kg/m  as calculated from the following:    Height as of this encounter: 1.676 m (5' 6\").    Weight as of this encounter: 83.5 kg (184 lb).  Medications and allergies reviewed.      Mishel BRIGGS MA    "

## 2019-04-26 NOTE — NURSING NOTE
"Initial /70   Pulse 68   Temp 97.8  F (36.6  C) (Tympanic)   Resp 12   Ht 1.676 m (5' 6\")   Wt 83.6 kg (184 lb 6.4 oz)   Breastfeeding? No   BMI 29.76 kg/m   Estimated body mass index is 29.76 kg/m  as calculated from the following:    Height as of this encounter: 1.676 m (5' 6\").    Weight as of this encounter: 83.6 kg (184 lb 6.4 oz). .      "

## 2019-04-27 ASSESSMENT — ANXIETY QUESTIONNAIRES: GAD7 TOTAL SCORE: 0

## 2019-04-30 LAB
COPATH REPORT: NORMAL
PAP: NORMAL

## 2019-05-01 LAB
FINAL DIAGNOSIS: NORMAL
HPV HR 12 DNA CVX QL NAA+PROBE: NEGATIVE
HPV16 DNA SPEC QL NAA+PROBE: NEGATIVE
HPV18 DNA SPEC QL NAA+PROBE: NEGATIVE
SPECIMEN DESCRIPTION: NORMAL
SPECIMEN SOURCE CVX/VAG CYTO: NORMAL

## 2019-05-24 ENCOUNTER — HOSPITAL ENCOUNTER (OUTPATIENT)
Dept: MAMMOGRAPHY | Facility: CLINIC | Age: 49
Discharge: HOME | End: 2019-05-24
Attending: FAMILY MEDICINE | Admitting: FAMILY MEDICINE
Payer: COMMERCIAL

## 2019-05-24 DIAGNOSIS — Z12.31 ENCOUNTER FOR SCREENING MAMMOGRAM FOR BREAST CANCER: ICD-10-CM

## 2019-05-24 PROCEDURE — 77063 BREAST TOMOSYNTHESIS BI: CPT

## 2019-07-29 ENCOUNTER — ANESTHESIA EVENT (OUTPATIENT)
Dept: GASTROENTEROLOGY | Facility: CLINIC | Age: 49
End: 2019-07-29
Payer: COMMERCIAL

## 2019-07-29 ASSESSMENT — LIFESTYLE VARIABLES: TOBACCO_USE: 1

## 2019-07-31 ENCOUNTER — ANESTHESIA (OUTPATIENT)
Dept: GASTROENTEROLOGY | Facility: CLINIC | Age: 49
End: 2019-07-31
Payer: COMMERCIAL

## 2019-07-31 ENCOUNTER — HOSPITAL ENCOUNTER (OUTPATIENT)
Facility: CLINIC | Age: 49
Discharge: HOME | End: 2019-07-31
Attending: SURGERY | Admitting: SURGERY
Payer: COMMERCIAL

## 2019-07-31 VITALS
SYSTOLIC BLOOD PRESSURE: 108 MMHG | WEIGHT: 180 LBS | RESPIRATION RATE: 16 BRPM | BODY MASS INDEX: 28.93 KG/M2 | TEMPERATURE: 98.1 F | HEIGHT: 66 IN | OXYGEN SATURATION: 97 % | DIASTOLIC BLOOD PRESSURE: 60 MMHG

## 2019-07-31 LAB
B-HCG SERPL-ACNC: 5 IU/L (ref 0–5)
COLONOSCOPY: NORMAL
HCG SERPL QL: NORMAL

## 2019-07-31 PROCEDURE — 25000128 H RX IP 250 OP 636: Performed by: NURSE ANESTHETIST, CERTIFIED REGISTERED

## 2019-07-31 PROCEDURE — 45385 COLONOSCOPY W/LESION REMOVAL: CPT | Mod: PT | Performed by: SURGERY

## 2019-07-31 PROCEDURE — 25000125 ZZHC RX 250: Performed by: SURGERY

## 2019-07-31 PROCEDURE — 25800030 ZZH RX IP 258 OP 636: Performed by: SURGERY

## 2019-07-31 PROCEDURE — 37000008 ZZH ANESTHESIA TECHNICAL FEE, 1ST 30 MIN: Performed by: SURGERY

## 2019-07-31 PROCEDURE — 45380 COLONOSCOPY AND BIOPSY: CPT | Mod: PT,XU | Performed by: SURGERY

## 2019-07-31 PROCEDURE — 88305 TISSUE EXAM BY PATHOLOGIST: CPT | Performed by: SURGERY

## 2019-07-31 PROCEDURE — 84702 CHORIONIC GONADOTROPIN TEST: CPT | Performed by: SURGERY

## 2019-07-31 PROCEDURE — 45380 COLONOSCOPY AND BIOPSY: CPT | Mod: 59 | Performed by: SURGERY

## 2019-07-31 PROCEDURE — 88305 TISSUE EXAM BY PATHOLOGIST: CPT | Mod: 26 | Performed by: SURGERY

## 2019-07-31 RX ORDER — SODIUM CHLORIDE, SODIUM LACTATE, POTASSIUM CHLORIDE, CALCIUM CHLORIDE 600; 310; 30; 20 MG/100ML; MG/100ML; MG/100ML; MG/100ML
INJECTION, SOLUTION INTRAVENOUS CONTINUOUS
Status: DISCONTINUED | OUTPATIENT
Start: 2019-07-31 | End: 2019-07-31 | Stop reason: HOSPADM

## 2019-07-31 RX ORDER — PROPOFOL 10 MG/ML
INJECTION, EMULSION INTRAVENOUS PRN
Status: DISCONTINUED | OUTPATIENT
Start: 2019-07-31 | End: 2019-07-31

## 2019-07-31 RX ORDER — NALOXONE HYDROCHLORIDE 0.4 MG/ML
.1-.4 INJECTION, SOLUTION INTRAMUSCULAR; INTRAVENOUS; SUBCUTANEOUS
Status: DISCONTINUED | OUTPATIENT
Start: 2019-07-31 | End: 2019-07-31 | Stop reason: HOSPADM

## 2019-07-31 RX ORDER — FLUMAZENIL 0.1 MG/ML
0.2 INJECTION, SOLUTION INTRAVENOUS
Status: DISCONTINUED | OUTPATIENT
Start: 2019-07-31 | End: 2019-07-31 | Stop reason: HOSPADM

## 2019-07-31 RX ORDER — LIDOCAINE 40 MG/G
CREAM TOPICAL
Status: DISCONTINUED | OUTPATIENT
Start: 2019-07-31 | End: 2019-07-31 | Stop reason: HOSPADM

## 2019-07-31 RX ORDER — ONDANSETRON 2 MG/ML
4 INJECTION INTRAMUSCULAR; INTRAVENOUS
Status: DISCONTINUED | OUTPATIENT
Start: 2019-07-31 | End: 2019-07-31 | Stop reason: HOSPADM

## 2019-07-31 RX ADMIN — LIDOCAINE HYDROCHLORIDE 0.1 ML: 10 INJECTION, SOLUTION EPIDURAL; INFILTRATION; INTRACAUDAL; PERINEURAL at 08:01

## 2019-07-31 RX ADMIN — PROPOFOL 100 MG: 10 INJECTION, EMULSION INTRAVENOUS at 08:47

## 2019-07-31 RX ADMIN — PROPOFOL 150 MG: 10 INJECTION, EMULSION INTRAVENOUS at 08:39

## 2019-07-31 RX ADMIN — SODIUM CHLORIDE, POTASSIUM CHLORIDE, SODIUM LACTATE AND CALCIUM CHLORIDE: 600; 310; 30; 20 INJECTION, SOLUTION INTRAVENOUS at 08:00

## 2019-07-31 RX ADMIN — PROPOFOL 150 MG: 10 INJECTION, EMULSION INTRAVENOUS at 08:41

## 2019-07-31 ASSESSMENT — MIFFLIN-ST. JEOR: SCORE: 1453.22

## 2019-07-31 NOTE — ANESTHESIA CARE TRANSFER NOTE
Patient: Kayley Godoy    Procedure(s):  COLONOSCOPY, FLEXIBLE, WITH LESION REMOVAL USING SNARE  Colonoscopy, With Polypectomy And Biopsy    Diagnosis: screening  Diagnosis Additional Information: No value filed.    Anesthesia Type:   No value filed.     Note:  Airway :Room Air  Patient transferred to:Phase II  Handoff Report: Identifed the Patient, Identified the Reponsible Provider, Reviewed the pertinent medical history, Discussed the surgical course, Reviewed Intra-OP anesthesia mangement and issues during anesthesia, Set expectations for post-procedure period and Allowed opportunity for questions and acknowledgement of understanding      Vitals: (Last set prior to Anesthesia Care Transfer)    CRNA VITALS  7/31/2019 0824 - 7/31/2019 0854      7/31/2019             Pulse:  81    Ht Rate:  78    SpO2:  98 %                Electronically Signed By: MIMI Chawla CRNA  July 31, 2019  8:54 AM

## 2019-07-31 NOTE — ANESTHESIA POSTPROCEDURE EVALUATION
Patient: Kayley Godoy    Procedure(s):  COLONOSCOPY, FLEXIBLE, WITH LESION REMOVAL USING SNARE  Colonoscopy, With Polypectomy And Biopsy    Diagnosis:screening  Diagnosis Additional Information: No value filed.    Anesthesia Type:  No value filed.    Note:  Anesthesia Post Evaluation    Patient location during evaluation: Bedside  Patient participation: Able to fully participate in evaluation  Level of consciousness: awake and alert  Pain management: adequate  Airway patency: patent  Cardiovascular status: acceptable  Respiratory status: acceptable  Hydration status: acceptable  PONV: none     Anesthetic complications: None          Last vitals:  Vitals:    07/31/19 0736 07/31/19 0901   BP: 119/86 91/55   Resp: 16 16   Temp: 36.7  C (98.1  F)    SpO2: 97% 94%         Electronically Signed By: MIMI Chawla CRNA  July 31, 2019  9:02 AM

## 2019-07-31 NOTE — H&P
"48 year old year old female here for colonoscopy for screening. This is patient's first colonoscopy.  Patient denies blood in stool or change in stool caliber.  There is no family history of CRC or polyps.    Patient Active Problem List   Diagnosis     ATYPICAL MELANOCYTIC NEVUS     BILATERAL KNEE PAIN     Decreased libido     Intramural leiomyoma of uterus     CARDIOVASCULAR SCREENING; LDL GOAL LESS THAN 160     ANAND 3     24 hour contact handout given     Dysthymia     Anxiety     Dyspepsia and other specified disorders of function of stomach     Dyspepsia       Past Medical History:   Diagnosis Date     Abnormal Pap smear of cervix \"In her 20's\"    Had treatment with \"laser\".  Believes it to have been a CIN3 lesion.  Had repeat biopsies and all normal follow up     ASCUS favor benign 4/29/16    Neg HPV       Past Surgical History:   Procedure Laterality Date     C APPENDECTOMY  age 13     CL AFF SURGICAL PATHOLOGY  1993     SURGICAL HISTORY OF -   2005    mole removal left upper chest     SURGICAL HISTORY OF -   2008    endometrial ablation       Family History   Problem Relation Age of Onset     Cerebrovascular Disease Mother      Diabetes Father      C.A.D. Father         stents at age 70     Hypertension No family hx of      Breast Cancer No family hx of      Cancer - colorectal No family hx of        No current outpatient medications on file.       Allergies   Allergen Reactions     Wellbutrin [Bupropion Hydrobromide] Itching       Pt reports that she has quit smoking. Her smoking use included cigarettes. She has a 7.50 pack-year smoking history. She has never used smokeless tobacco. She reports that she drinks alcohol. She reports that she does not use drugs.    Exam:  /86   Temp 98.1  F (36.7  C) (Oral)   Resp 16   Ht 1.676 m (5' 6\")   Wt 81.6 kg (180 lb)   SpO2 97%   BMI 29.05 kg/m      Awake, Alert OX3  Lungs - CTA bilaterally  CV - RRR, no murmurs, distal pulses intact  Abd - soft, " non-distended, non-tender, +BS  Extr - No cyanosis or edema    A/P 48 year old year old female in need of colonoscopy for screening. Risks, benefits, alternatives, and complications were discussed including the possibility of perforation, bleeding, missed lesion and the patient agreed to proceed    Dagoberto Ho DO on 7/31/2019 at 8:00 AM

## 2019-08-04 LAB — COPATH REPORT: NORMAL

## 2019-08-05 PROBLEM — D12.6 TUBULAR ADENOMA OF COLON: Status: ACTIVE | Noted: 2019-08-05

## 2019-12-06 ENCOUNTER — ANCILLARY PROCEDURE (OUTPATIENT)
Dept: GENERAL RADIOLOGY | Facility: CLINIC | Age: 49
End: 2019-12-06
Attending: NURSE PRACTITIONER
Payer: COMMERCIAL

## 2019-12-06 ENCOUNTER — OFFICE VISIT (OUTPATIENT)
Dept: FAMILY MEDICINE | Facility: CLINIC | Age: 49
End: 2019-12-06
Payer: COMMERCIAL

## 2019-12-06 VITALS
HEART RATE: 79 BPM | RESPIRATION RATE: 20 BRPM | WEIGHT: 189 LBS | HEIGHT: 66 IN | DIASTOLIC BLOOD PRESSURE: 78 MMHG | TEMPERATURE: 98.7 F | OXYGEN SATURATION: 95 % | SYSTOLIC BLOOD PRESSURE: 108 MMHG | BODY MASS INDEX: 30.37 KG/M2

## 2019-12-06 DIAGNOSIS — R05.9 COUGH: ICD-10-CM

## 2019-12-06 DIAGNOSIS — J22 LOWER RESPIRATORY INFECTION: Primary | ICD-10-CM

## 2019-12-06 PROCEDURE — 99213 OFFICE O/P EST LOW 20 MIN: CPT | Performed by: NURSE PRACTITIONER

## 2019-12-06 PROCEDURE — 71046 X-RAY EXAM CHEST 2 VIEWS: CPT

## 2019-12-06 RX ORDER — AZITHROMYCIN 250 MG/1
TABLET, FILM COATED ORAL
Qty: 6 TABLET | Refills: 0 | Status: SHIPPED | OUTPATIENT
Start: 2019-12-06 | End: 2019-12-11

## 2019-12-06 RX ORDER — ALBUTEROL SULFATE 90 UG/1
2 AEROSOL, METERED RESPIRATORY (INHALATION) EVERY 6 HOURS
Qty: 1 INHALER | Refills: 0 | Status: SHIPPED | OUTPATIENT
Start: 2019-12-06 | End: 2020-08-17

## 2019-12-06 ASSESSMENT — MIFFLIN-ST. JEOR: SCORE: 1489.05

## 2019-12-06 NOTE — PROGRESS NOTES
Subjective     Kayley oGdoy is a 49 year old female who presents to clinic today for the following health issues:    HPI   ENT Symptoms             Symptoms: cc Present Absent Comment   Fever/Chills  x  Little, never checked   Fatigue  x     Muscle Aches  x     Eye Irritation   x    Sneezing   x    Nasal Cam/Drg  x     Sinus Pressure/Pain   x    Loss of smell   x    Dental pain   x    Sore Throat   x    Swollen Glands   x    Ear Pain/Fullness   x    Cough x x     Wheeze x x     Chest Pain  x     Shortness of breath  x     Rash   x    Other  x  headaches      Symptom duration:  started about a week now,    Symptom severity:  moderate   Treatments tried:  Nyquil   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 Additionally, no hemoptysis, no lower extremity swelling, no dyspnea on exertion. Please see student note dated today for further HPI details.    Patient Active Problem List   Diagnosis     ATYPICAL MELANOCYTIC NEVUS     BILATERAL KNEE PAIN     Decreased libido     Intramural leiomyoma of uterus     CARDIOVASCULAR SCREENING; LDL GOAL LESS THAN 160     ANAND 3     24 hour contact handout given     Dysthymia     Anxiety     Dyspepsia and other specified disorders of function of stomach     Dyspepsia     Tubular adenoma of colon     Past Surgical History:   Procedure Laterality Date     C APPENDECTOMY  age 13     CL AFF SURGICAL PATHOLOGY  1993     COLONOSCOPY N/A 7/31/2019    Procedure: Colonoscopy, With Polypectomy And Biopsy;  Surgeon: Dagoberto Ho DO;  Location: [facility]     SURGICAL HISTORY OF -   2005    mole removal left upper chest     SURGICAL HISTORY OF -   2008    endometrial ablation       Social History     Tobacco Use     Smoking status: Former Smoker     Packs/day: 0.50     Years: 15.00     Pack years: 7.50     Types: Cigarettes     Smokeless tobacco: Never Used   Substance Use Topics     Alcohol use: Yes     Comment: rarely     Family History   Problem Relation Age of  "Onset     Cerebrovascular Disease Mother      Diabetes Father      C.A.D. Father         stents at age 70     Hypertension No family hx of      Breast Cancer No family hx of      Cancer - colorectal No family hx of          Current Outpatient Medications   Medication Sig Dispense Refill     albuterol (PROAIR HFA/PROVENTIL HFA/VENTOLIN HFA) 108 (90 Base) MCG/ACT inhaler Inhale 2 puffs into the lungs every 6 hours 1 Inhaler 0     azithromycin (ZITHROMAX) 250 MG tablet Take 2 tablets (500 mg) by mouth daily for 1 day, THEN 1 tablet (250 mg) daily for 4 days. 6 tablet 0     IBUPROFEN 200 MG OR TABS AS NEEDED       MAGNESIUM PO Take by mouth daily as needed           Reviewed and updated as needed this visit by Provider  Tobacco  Allergies  Meds  Problems  Med Hx  Surg Hx  Fam Hx         Review of Systems   ROS COMP: Constitutional, HEENT, cardiovascular, pulmonary, gi and gu systems are negative, except as otherwise noted.      Objective    /78   Pulse 79   Temp 98.7  F (37.1  C) (Tympanic)   Resp 20   Ht 1.676 m (5' 6\")   Wt 85.7 kg (189 lb)   SpO2 95%   BMI 30.51 kg/m    Body mass index is 30.51 kg/m .  Physical Exam  Vitals signs and nursing note reviewed.   Constitutional:       Appearance: Normal appearance.   HENT:      Head: Normocephalic and atraumatic.      Right Ear: Tympanic membrane and ear canal normal.      Left Ear: Tympanic membrane and ear canal normal.      Nose: Nose normal. No rhinorrhea.      Right Sinus: No maxillary sinus tenderness or frontal sinus tenderness.      Left Sinus: No maxillary sinus tenderness or frontal sinus tenderness.      Mouth/Throat:      Lips: Pink.      Mouth: Mucous membranes are moist.      Pharynx: Oropharynx is clear.   Eyes:      General: Lids are normal.      Conjunctiva/sclera: Conjunctivae normal.      Comments: Non icteric   Neck:      Musculoskeletal: Neck supple.   Cardiovascular:      Rate and Rhythm: Normal rate and regular rhythm.      " "Pulses: Normal pulses.      Heart sounds: Normal heart sounds, S1 normal and S2 normal.   Pulmonary:      Effort: Pulmonary effort is normal.      Breath sounds: Normal air entry. Wheezing (LLL) and rhonchi (LLL) present.   Lymphadenopathy:      Cervical: No cervical adenopathy.   Skin:     General: Skin is warm and dry.   Neurological:      General: No focal deficit present.      Mental Status: She is alert and oriented to person, place, and time.   Psychiatric:         Mood and Affect: Mood normal.         Behavior: Behavior normal.         Thought Content: Thought content normal.         Judgment: Judgment normal.          Diagnostic Test Results:  Labs reviewed in Epic  Xray - CXR, ?LLL opacity. Radiology interpretation pending.        Assessment & Plan     1. Lower respiratory infection  ?able LLL infiltrate, will cover with azithromycin. Not hypoxic, afebrile. Appropriate for outpatient management.  - XR Chest 2 Views; Future  - azithromycin (ZITHROMAX) 250 MG tablet; Take 2 tablets (500 mg) by mouth daily for 1 day, THEN 1 tablet (250 mg) daily for 4 days.  Dispense: 6 tablet; Refill: 0  - albuterol (PROAIR HFA/PROVENTIL HFA/VENTOLIN HFA) 108 (90 Base) MCG/ACT inhaler; Inhale 2 puffs into the lungs every 6 hours  Dispense: 1 Inhaler; Refill: 0     BMI:   Estimated body mass index is 30.51 kg/m  as calculated from the following:    Height as of this encounter: 1.676 m (5' 6\").    Weight as of this encounter: 85.7 kg (189 lb).       See Patient Instructions    Return in about 1 week (around 12/13/2019) for worsening or continued symptoms.    MIMI Dove [facility]      "

## 2019-12-06 NOTE — PROGRESS NOTES
Subjective     Kayley Godoy is a 49 year old female who presents to clinic today for the following health issues:  Worsening cough for the past 6 days. States that she was feeling about 10 days ago, started to feel a better around 7 days ago (Thanksgiving) and has been progressively worsening since.Sinsus congestion, producitve cough in am, dry throughout the day. Fatigue, body aches, intermittent dull headache on right frontal side; denies fever, sore throat, ear or eye pain. Taking Nyquil and Dayquil, and ibuprofen with limited relief of symptoms.  Missed 2 days of work as a .     HPI   Patient Active Problem List   Diagnosis     ATYPICAL MELANOCYTIC NEVUS     BILATERAL KNEE PAIN     Decreased libido     Intramural leiomyoma of uterus     CARDIOVASCULAR SCREENING; LDL GOAL LESS THAN 160     ANAND 3     24 hour contact handout given     Dysthymia     Anxiety     Dyspepsia and other specified disorders of function of stomach     Dyspepsia     Tubular adenoma of colon     Past Surgical History:   Procedure Laterality Date     C APPENDECTOMY  age 13     [facility] SURGICAL PATHOLOGY  1993     COLONOSCOPY N/A 7/31/2019    Procedure: Colonoscopy, With Polypectomy And Biopsy;  Surgeon: Dagoberto Ho DO;  Location: [facility]     SURGICAL HISTORY OF -   2005    mole removal left upper chest     SURGICAL HISTORY OF -   2008    endometrial ablation       Social History     Tobacco Use     Smoking status: Former Smoker     Packs/day: 0.50     Years: 15.00     Pack years: 7.50     Types: Cigarettes     Smokeless tobacco: Never Used   Substance Use Topics     Alcohol use: Yes     Comment: rarely     Family History   Problem Relation Age of Onset     Cerebrovascular Disease Mother      Diabetes Father      C.A.D. Father         stents at age 70     Hypertension No family hx of      Breast Cancer No family hx of      Cancer - colorectal No family hx of          Current Outpatient Medications   Medication Sig  "Dispense Refill     IBUPROFEN 200 MG OR TABS AS NEEDED       MAGNESIUM PO Take by mouth daily as needed       Allergies   Allergen Reactions     Wellbutrin [Bupropion Hydrobromide] Itching       Reviewed and updated as needed this visit by Provider         Review of Systems   ROS COMP: Constitutional, HEENT, cardiovascular, pulmonary, GI, , musculoskeletal, neuro, skin, endocrine and psych systems are negative, except as otherwise noted.      Objective    /78   Pulse 79   Temp 98.7  F (37.1  C) (Tympanic)   Resp 20   Ht 1.676 m (5' 6\")   Wt 85.7 kg (189 lb)   SpO2 95%   BMI 30.51 kg/m    Body mass index is 30.51 kg/m .  Physical Exam  Vitals signs and nursing note reviewed.   Constitutional:       General: She is not in acute distress.     Appearance: Normal appearance. She is ill-appearing.   HENT:      Head: Normocephalic and atraumatic.      Right Ear: Tympanic membrane and external ear normal.      Left Ear: Tympanic membrane and external ear normal.      Nose: Congestion present.      Mouth/Throat:      Mouth: Mucous membranes are moist.      Pharynx: Oropharyngeal exudate and posterior oropharyngeal erythema (mild) present.   Eyes:      Extraocular Movements: Extraocular movements intact.      Conjunctiva/sclera: Conjunctivae normal.      Pupils: Pupils are equal, round, and reactive to light.   Neck:      Musculoskeletal: Normal range of motion.   Cardiovascular:      Rate and Rhythm: Normal rate and regular rhythm.      Heart sounds: Normal heart sounds.   Pulmonary:      Breath sounds: Rhonchi (Left lower lung base) present.   Musculoskeletal: Normal range of motion.   Skin:     General: Skin is warm and dry.   Neurological:      Mental Status: She is alert and oriented to person, place, and time.   Psychiatric:         Mood and Affect: Mood normal.         Thought Content: Thought content normal.         Judgment: Judgment normal.        Diagnostic Test Results:  Labs reviewed in Epic  CXR - " "preliminary reading performed in clinic are questionable for left lower lung infiltrate. Pending final read by radiologist.         Assessment & Plan     1. Lower respiratory infection  Based on you history, physical exam and preliminary chest xray readings, it sounds like you have the start of pneumonia. The will provide a final read of your chest x-ray. Given the symptoms you are presenting with, an antibiotic and albuterol inhaler were prescribed. Discussed symptomatic cares that may be performed at home as well. Follow up in 1 week if symptoms persist or worsen.   - XR Chest 2 Views; Future  - azithromycin (ZITHROMAX) 250 MG tablet; Take 2 tablets (500 mg) by mouth daily for 1 day, THEN 1 tablet (250 mg) daily for 4 days.  Dispense: 6 tablet; Refill: 0  - albuterol (PROAIR HFA/PROVENTIL HFA/VENTOLIN HFA) 108 (90 Base) MCG/ACT inhaler; Inhale 2 puffs into the lungs every 6 hours  Dispense: 1 Inhaler; Refill: 0     BMI:   Estimated body mass index is 30.51 kg/m  as calculated from the following:    Height as of this encounter: 1.676 m (5' 6\").    Weight as of this encounter: 85.7 kg (189 lb).       See Patient Instructions  Patient Instructions   Start azithromycin today.      Albuterol inhaler as needed for cough, wheezing.    Return/call with no improvement.      No follow-ups on file.    Nicole Richardson, JULISA Student    "

## 2019-12-06 NOTE — PATIENT INSTRUCTIONS
Start azithromycin today.      Albuterol inhaler as needed for cough, wheezing.    Return/call with no improvement.

## 2019-12-17 ENCOUNTER — MYC MEDICAL ADVICE (OUTPATIENT)
Dept: FAMILY MEDICINE | Facility: CLINIC | Age: 49
End: 2019-12-17

## 2020-02-10 ENCOUNTER — HEALTH MAINTENANCE LETTER (OUTPATIENT)
Age: 50
End: 2020-02-10

## 2020-08-03 ENCOUNTER — E-VISIT (OUTPATIENT)
Dept: FAMILY MEDICINE | Facility: CLINIC | Age: 50
End: 2020-08-03
Payer: COMMERCIAL

## 2020-08-03 DIAGNOSIS — G47.00 INSOMNIA, UNSPECIFIED TYPE: Primary | ICD-10-CM

## 2020-08-03 PROCEDURE — 99207 ZZC NON-BILLABLE SERV PER CHARTING: CPT | Performed by: FAMILY MEDICINE

## 2020-08-17 ENCOUNTER — VIRTUAL VISIT (OUTPATIENT)
Dept: FAMILY MEDICINE | Facility: CLINIC | Age: 50
End: 2020-08-17
Payer: COMMERCIAL

## 2020-08-17 DIAGNOSIS — G47.00 INSOMNIA, UNSPECIFIED TYPE: Primary | ICD-10-CM

## 2020-08-17 DIAGNOSIS — N95.1 VAGINAL DRYNESS, MENOPAUSAL: ICD-10-CM

## 2020-08-17 DIAGNOSIS — N95.1 VASOMOTOR SYMPTOMS DUE TO MENOPAUSE: ICD-10-CM

## 2020-08-17 PROCEDURE — 99213 OFFICE O/P EST LOW 20 MIN: CPT | Mod: TEL | Performed by: FAMILY MEDICINE

## 2020-08-17 RX ORDER — VENLAFAXINE HYDROCHLORIDE 37.5 MG/1
CAPSULE, EXTENDED RELEASE ORAL
Qty: 60 CAPSULE | Refills: 1 | Status: SHIPPED | OUTPATIENT
Start: 2020-08-17 | End: 2020-12-14

## 2020-08-17 RX ORDER — ZOLPIDEM TARTRATE 5 MG/1
5 TABLET ORAL
Qty: 10 TABLET | Refills: 0 | Status: SHIPPED | OUTPATIENT
Start: 2020-08-17 | End: 2020-09-30

## 2020-08-17 ASSESSMENT — PATIENT HEALTH QUESTIONNAIRE - PHQ9: SUM OF ALL RESPONSES TO PHQ QUESTIONS 1-9: 12

## 2020-08-17 NOTE — PROGRESS NOTES
"Kayley Godoy is a 49 year old female who is being evaluated via a billable telephone visit.      The patient has been notified of following:     \"This telephone visit will be conducted via a call between you and your physician/provider. We have found that certain health care needs can be provided without the need for a physical exam.  This service lets us provide the care you need with a short phone conversation.  If a prescription is necessary we can send it directly to your pharmacy.  If lab work is needed we can place an order for that and you can then stop by our lab to have the test done at a later time.    Telephone visits are billed at different rates depending on your insurance coverage. During this emergency period, for some insurers they may be billed the same as an in-person visit.  Please reach out to your insurance provider with any questions.    If during the course of the call the physician/provider feels a telephone visit is not appropriate, you will not be charged for this service.\"    Patient has given verbal consent for Telephone visit?  Yes    What phone number would you like to be contacted at? [phone]    How would you like to obtain your AVS? [facility]    11:57 AM    Subjective     Kayley Godoy is a 49 year old female who presents via phone visit today for the following health issues:    HPI    * insomnia    Pt tends to travel frequently and likes to take \"long weekends\".  States when she travels she \"never sleeps\" the first night.  Has gotten to the point where she doesn't even want to go due to the sleep issue.      Has tried Tylenol PM but does not work for her.  Insomnia is really only an issue when she travels.  Has some hot flashes/night sweats but this does not disrupt her sleep in general.    * vaginal dryness and perimenopause  Had discussed at her visit 4/2019.  Continues to have hot flashes/night sweats that are bothersome, tolerable but can be disruptive to sleep.   has " "noted more \"edgy\" mood and pt has been feeling irritable.    Vaginal dryness remains an issue.  Has tried over the counter lubricants as we had discussed at her visit but remains problematic.  Would be interested in trying a vaginal estrogen product as previously discussed.      * discuss COVID antibody testing   Had COVID this summer.  Went traveling with friends this summer where she caught it.  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    Wonders where she can get the documentation of her previous positive test.       Reviewed and updated as needed this visit by Provider  Tobacco  Allergies  Meds  Med Hx  Surg Hx  Fam Hx  Soc Hx        Review of Systems   Constitutional, HEENT, cardiovascular, pulmonary, gi and gu systems are negative, except as otherwise noted.       Objective          Vitals:  No vitals were obtained today due to virtual visit.    healthy, alert and no distress  PSYCH: Alert and oriented times 3; coherent speech, normal   rate and volume, able to articulate logical thoughts, able   to abstract reason, no tangential thoughts, no hallucinations   or delusions  Her affect is normal  RESP: No cough, no audible wheezing, able to talk in full sentences  Remainder of exam unable to be completed due to telephone visits    Diagnostic Test Results:  Labs reviewed in Epic        Assessment/Plan:    ICD-10-CM    1. Insomnia, unspecified type  G47.00    2. Vaginal dryness, menopausal  N95.1    3. Vasomotor symptoms due to menopause  N95.1      Patient Instructions   Insomnia:  We'll try the ambien as we discussed.  Be sure to not combine this with alcohol and have at least 8 hours to be in bed after taking.      Vaginal dryness:  We'll try the Estring suppository.  Let me know if cost is an issue moving forward.  Keep an eye out for any vaginal bleeding and let me know " right away if that happens.    Menopause symptoms:  We'll try the nonhormonal venlafaxine as we discussed.  Let's give it 6-8 weeks at the 75mg dose and then let me know how it is working.  There are dose increase available if needed.    Reviewed risks vs benefits of oral HRT vs trial of nonhormonal medication for vasomotor symptoms and vaginal hormone.  Reviewed risk of endometrial cancers.  Plan as above.      12:14 PM    Phone call duration:  16  minutes

## 2020-08-17 NOTE — PATIENT INSTRUCTIONS
Insomnia:  We'll try the ambien as we discussed.  Be sure to not combine this with alcohol and have at least 8 hours to be in bed after taking.      Vaginal dryness:  We'll try the Estring suppository.  Let me know if cost is an issue moving forward.  Keep an eye out for any vaginal bleeding and let me know right away if that happens.    Menopause symptoms:  We'll try the nonhormonal venlafaxine as we discussed.  Let's give it 6-8 weeks at the 75mg dose and then let me know how it is working.  There are dose increase available if needed.

## 2020-09-29 DIAGNOSIS — G47.00 INSOMNIA, UNSPECIFIED TYPE: ICD-10-CM

## 2020-09-30 RX ORDER — ZOLPIDEM TARTRATE 5 MG/1
TABLET ORAL
Qty: 10 TABLET | Refills: 0 | Status: SHIPPED | OUTPATIENT
Start: 2020-09-30 | End: 2020-12-14

## 2020-09-30 NOTE — TELEPHONE ENCOUNTER
Routing refill request to provider for review/approval because:  Drug not on the [facility] refill protocol   Susi Rowley RN

## 2020-11-16 ENCOUNTER — HEALTH MAINTENANCE LETTER (OUTPATIENT)
Age: 50
End: 2020-11-16

## 2020-12-14 ENCOUNTER — MYC REFILL (OUTPATIENT)
Dept: FAMILY MEDICINE | Facility: CLINIC | Age: 50
End: 2020-12-14

## 2020-12-14 DIAGNOSIS — N95.1 VASOMOTOR SYMPTOMS DUE TO MENOPAUSE: ICD-10-CM

## 2020-12-14 DIAGNOSIS — G47.00 INSOMNIA, UNSPECIFIED TYPE: ICD-10-CM

## 2020-12-15 RX ORDER — ZOLPIDEM TARTRATE 5 MG/1
5 TABLET ORAL
Qty: 10 TABLET | Refills: 0 | Status: SHIPPED | OUTPATIENT
Start: 2020-12-15 | End: 2021-02-10

## 2020-12-15 RX ORDER — VENLAFAXINE HYDROCHLORIDE 37.5 MG/1
75 CAPSULE, EXTENDED RELEASE ORAL DAILY
Qty: 60 CAPSULE | Refills: 1 | Status: SHIPPED | OUTPATIENT
Start: 2020-12-15 | End: 2021-01-04

## 2020-12-16 NOTE — TELEPHONE ENCOUNTER
Pt was due for f/u in October.  Please ask her to schedule virtual visit to discuss meds prior to next refill.    Steph Pedraza, DO

## 2021-01-04 ENCOUNTER — VIRTUAL VISIT (OUTPATIENT)
Dept: FAMILY MEDICINE | Facility: CLINIC | Age: 51
End: 2021-01-04
Payer: COMMERCIAL

## 2021-01-04 DIAGNOSIS — N95.1 VASOMOTOR SYMPTOMS DUE TO MENOPAUSE: Primary | ICD-10-CM

## 2021-01-04 DIAGNOSIS — G47.00 INSOMNIA, UNSPECIFIED TYPE: ICD-10-CM

## 2021-01-04 PROCEDURE — 99213 OFFICE O/P EST LOW 20 MIN: CPT | Mod: TEL | Performed by: FAMILY MEDICINE

## 2021-01-04 RX ORDER — VENLAFAXINE HYDROCHLORIDE 37.5 MG/1
37.5 CAPSULE, EXTENDED RELEASE ORAL DAILY
Qty: 90 CAPSULE | Refills: 3 | Status: SHIPPED | OUTPATIENT
Start: 2021-01-04 | End: 2021-05-04

## 2021-01-04 ASSESSMENT — PATIENT HEALTH QUESTIONNAIRE - PHQ9: SUM OF ALL RESPONSES TO PHQ QUESTIONS 1-9: 3

## 2021-01-04 NOTE — PROGRESS NOTES
"Kayley Godoy is a 50 year old female who is being evaluated via a billable telephone visit.      What phone number would you like to be contacted at? [phone]  How would you like to obtain your AVS? MyChart         A/p:      ICD-10-CM    1. Vasomotor symptoms due to menopause  N95.1 venlafaxine (EFFEXOR-XR) 37.5 MG 24 hr capsule   2. Insomnia, unspecified type  G47.00      Vasomotor symptoms:  Well controlled on 37.5mg dose with no side effects.  Continue med    Insomnia:  Improved with ambien.  Cautioned to not use with other sedatives.  Pt will call for refill if needed.    2:18 PM    Subjective     Kayley Godoy is a 50 year old female who presents to clinic today for the following health issues     HPI       Medication Followup of venlafaxine    Taking Medication as prescribed: yes - taking for menopause symptoms     Side Effects:  None    Medication Helping Symptoms:  yes     Has just been taking 37.5mg of venlafaxine daily.  Feels it has been working well.    *  Has been using ambien for sleep as well and feels it has been working.  Uses it when \"not in her own bed\".  Uses it maybe 2-3 times per month.  Has been taking a Tylenol PM with it at times.  Feels like her brain \"does not shut off\".      Does feel it is helpful in that she has stopped turning down activities due to concerns over insomnia.  Does not really happen at home.  Wants to continue with current dose    *  Had Covid in June.  States she is \"fine all day everyday\".  Can still have a mild cough when she is going to bed at night and in the morning.  Feels like a \"tickle\".  Very dry.  Also feels like she is \"in a fog\". Feels like she is \"not present\" in situations at times.  Just feels like she is not paying attention.  Symptoms are very mild, not really concerning, just wanted to mention.      Review of Systems   Constitutional, HEENT, cardiovascular, pulmonary, gi and gu systems are negative, except as otherwise noted.      Objective     "       Vitals:  No vitals were obtained today due to virtual visit.    Physical Exam   healthy, alert and no distress  PSYCH: Alert and oriented times 3; coherent speech, normal   rate and volume, able to articulate logical thoughts, able   to abstract reason, no tangential thoughts, no hallucinations   or delusions  Her affect is normal  RESP: No cough, no audible wheezing, able to talk in full sentences  Remainder of exam unable to be completed due to telephone visits    Epic reviewed            2:28 PM      Phone call duration: 10 minutes

## 2021-02-07 ENCOUNTER — HEALTH MAINTENANCE LETTER (OUTPATIENT)
Age: 51
End: 2021-02-07

## 2021-02-10 ENCOUNTER — MYC REFILL (OUTPATIENT)
Dept: FAMILY MEDICINE | Facility: CLINIC | Age: 51
End: 2021-02-10

## 2021-02-10 DIAGNOSIS — G47.00 INSOMNIA, UNSPECIFIED TYPE: ICD-10-CM

## 2021-02-11 RX ORDER — ZOLPIDEM TARTRATE 5 MG/1
5 TABLET ORAL
Qty: 10 TABLET | Refills: 0 | Status: SHIPPED | OUTPATIENT
Start: 2021-02-11 | End: 2021-04-30

## 2021-02-25 ENCOUNTER — MYC MEDICAL ADVICE (OUTPATIENT)
Dept: FAMILY MEDICINE | Facility: CLINIC | Age: 51
End: 2021-02-25

## 2021-04-03 ENCOUNTER — HEALTH MAINTENANCE LETTER (OUTPATIENT)
Age: 51
End: 2021-04-03

## 2021-04-30 ENCOUNTER — OFFICE VISIT (OUTPATIENT)
Dept: FAMILY MEDICINE | Facility: CLINIC | Age: 51
End: 2021-04-30
Payer: COMMERCIAL

## 2021-04-30 VITALS
HEART RATE: 86 BPM | TEMPERATURE: 97.3 F | HEIGHT: 66 IN | WEIGHT: 182 LBS | OXYGEN SATURATION: 98 % | BODY MASS INDEX: 29.25 KG/M2 | DIASTOLIC BLOOD PRESSURE: 68 MMHG | SYSTOLIC BLOOD PRESSURE: 118 MMHG

## 2021-04-30 DIAGNOSIS — R41.3 MEMORY CHANGE: Primary | ICD-10-CM

## 2021-04-30 DIAGNOSIS — G47.00 INSOMNIA, UNSPECIFIED TYPE: ICD-10-CM

## 2021-04-30 DIAGNOSIS — M25.562 CHRONIC PAIN OF LEFT KNEE: ICD-10-CM

## 2021-04-30 DIAGNOSIS — G89.29 CHRONIC PAIN OF LEFT KNEE: ICD-10-CM

## 2021-04-30 DIAGNOSIS — Z12.31 ENCOUNTER FOR SCREENING MAMMOGRAM FOR BREAST CANCER: ICD-10-CM

## 2021-04-30 PROCEDURE — 99213 OFFICE O/P EST LOW 20 MIN: CPT | Performed by: FAMILY MEDICINE

## 2021-04-30 RX ORDER — ZOLPIDEM TARTRATE 5 MG/1
5 TABLET ORAL
Qty: 10 TABLET | Refills: 0 | Status: SHIPPED | OUTPATIENT
Start: 2021-04-30 | End: 2021-06-03

## 2021-04-30 ASSESSMENT — MIFFLIN-ST. JEOR: SCORE: 1448.33

## 2021-04-30 NOTE — PATIENT INSTRUCTIONS
I placed a referral for the sports medicine clinic for you.  You should get a call in 1-2 days to help you get scheduled to evaluate your knee.    We could certainly consider some formal testing of your memory.  This would be done through neuropsychological testing.  I would be happy to place that referral.  Often times our memory is a function of how much attention we are paying and anxiety and multitasking can impact how our memory functions  The testing could help us to clarify that.    Please schedule your mammogram as soon as you are able.

## 2021-04-30 NOTE — NURSING NOTE
Six Item Cognitive Impairment Test   (6CIT):      What year is it?                               Correct - 0 points    What month is it?                               Correct - 0 points      Give the patient an address to remember with five components:   [place]ashaun Santos ( first and last name - 2 components)   [address]  (number and name of street - 2 components)   [place] ( city - 1 component)      About what time is it (within the hour)? Correct - 0 points    Count backwards from 20 to 1:   Correct - 0 points    Say the months of the year in reverse: Correct - 0 points    Repeat the address phrase:   Correct - 0 points    Total 6CIT Score:      0/28    Interpretation: The 6CIT uses an inverse score and questions are weighted to produce a total out of 28. Scores of 0-7 are considered normal and 8 or more significant.    Advantages The test has high sensitivity without compromising specificity even in mild dementia. It is easy to translate linguistically and culturally.  Disadvantages The main disadvantage is in the scoring and weighting of the test, which is initially confusing, however computer models have simplified this greatly.    Probability Statistics: At the 7/8 cut off: Overall figures sensitivity 90% specificity 100%, in mild dementia sensitivity = 78% , specificity = 100%    Copyright 2000 [facility], [facility], [place]. Courtesy of Dr. Salo Zhao

## 2021-04-30 NOTE — PROGRESS NOTES
"    A/P:      ICD-10-CM    1. Memory change  R41.3    2. Insomnia, unspecified type  G47.00 zolpidem (AMBIEN) 5 MG tablet   3. Chronic pain of left knee  M25.562 [facility]  Referral    G89.29    4. Encounter for screening mammogram for breast cancer  Z12.31 *MA Screening Digital Bilateral     Patient Instructions   I placed a referral for the sports medicine clinic for you.  You should get a call in 1-2 days to help you get scheduled to evaluate your knee.    We could certainly consider some formal testing of your memory.  This would be done through neuropsychological testing.  I would be happy to place that referral.  Often times our memory is a function of how much attention we are paying and anxiety and multitasking can impact how our memory functions  The testing could help us to clarify that.    Please schedule your mammogram as soon as you are able.        Andres Zaldivar is a 50 year old who presents for the following health issues  HPI     * memory concerns  Feels like the memory concern is worsening.  Worries a lot that she has cognitive impairment or early dementia.  Has a lot on her plate, caring for both parents and \"teaching in a pandemic\", not sure if this is related to her recent struggles..    Little things like forgetting that they went to a cabin in the fall or repeating stories.  Feels like she is very aware of forgetting things and feels like her fmaily is noticing as well.  Wonders what she can do for evaluation    * left knee pain     Feels like the knee \"goes out\".  At times will feel like the knee jill when she is walking.  Has pain after this for a period of time. Then is resolved by the next day.  Would like to see someone for evaluation      * requesting refill of Ambien works well for insomnia while traveling.  Leaving to go out of town from this visit.  No side effects noted.      Review of Systems   Constitutional, HEENT, cardiovascular, pulmonary, gi and gu " "systems are negative, except as otherwise noted.      Objective    /68   Pulse 86   Temp 97.3  F (36.3  C) (Tympanic)   Ht 1.67 m (5' 5.75\")   Wt 82.6 kg (182 lb)   SpO2 98%   Breastfeeding No   BMI 29.60 kg/m    Body mass index is 29.6 kg/m .  Physical Exam   PE:  VS as above   Gen:  WN/WD/WH female in NAD  Psych: Alert and oriented times 3; coherent speech, normal   rate and volume, able to articulate logical thoughts, able   to abstract reason, no tangential thoughts, no hallucinations   or delusions  Her affect is anxious      Epic reviewed          "

## 2021-04-30 NOTE — NURSING NOTE
"Initial /68   Pulse 86   Temp 97.3  F (36.3  C) (Tympanic)   Ht 1.67 m (5' 5.75\")   Wt 82.6 kg (182 lb)   SpO2 98%   Breastfeeding No   BMI 29.60 kg/m   Estimated body mass index is 29.6 kg/m  as calculated from the following:    Height as of this encounter: 1.67 m (5' 5.75\").    Weight as of this encounter: 82.6 kg (182 lb). .      "

## 2021-05-04 ENCOUNTER — MYC MEDICAL ADVICE (OUTPATIENT)
Dept: FAMILY MEDICINE | Facility: CLINIC | Age: 51
End: 2021-05-04

## 2021-05-04 DIAGNOSIS — N95.1 VASOMOTOR SYMPTOMS DUE TO MENOPAUSE: ICD-10-CM

## 2021-05-04 RX ORDER — VENLAFAXINE HYDROCHLORIDE 37.5 MG/1
37.5 CAPSULE, EXTENDED RELEASE ORAL DAILY
Qty: 90 CAPSULE | Refills: 2 | Status: SHIPPED | OUTPATIENT
Start: 2021-05-04 | End: 2021-08-16

## 2021-06-03 ENCOUNTER — E-VISIT (OUTPATIENT)
Dept: FAMILY MEDICINE | Facility: CLINIC | Age: 51
End: 2021-06-03
Payer: COMMERCIAL

## 2021-06-03 ENCOUNTER — MYC MEDICAL ADVICE (OUTPATIENT)
Dept: FAMILY MEDICINE | Facility: CLINIC | Age: 51
End: 2021-06-03

## 2021-06-03 DIAGNOSIS — G47.00 INSOMNIA, UNSPECIFIED TYPE: ICD-10-CM

## 2021-06-03 DIAGNOSIS — N39.0 ACUTE UTI (URINARY TRACT INFECTION): Primary | ICD-10-CM

## 2021-06-03 PROCEDURE — 99421 OL DIG E/M SVC 5-10 MIN: CPT | Performed by: FAMILY MEDICINE

## 2021-06-03 RX ORDER — ZOLPIDEM TARTRATE 5 MG/1
5 TABLET ORAL
Qty: 10 TABLET | Refills: 0 | Status: SHIPPED | OUTPATIENT
Start: 2021-06-03 | End: 2021-07-21

## 2021-06-03 RX ORDER — NITROFURANTOIN 25; 75 MG/1; MG/1
100 CAPSULE ORAL 2 TIMES DAILY
Qty: 10 CAPSULE | Refills: 0 | Status: SHIPPED | OUTPATIENT
Start: 2021-06-03 | End: 2021-06-08

## 2021-06-03 NOTE — PATIENT INSTRUCTIONS
Dear Kayley Godoy    After reviewing your responses, I've been able to diagnose you with a urinary tract infection, which is a common infection of the bladder with bacteria.  This is not a sexually transmitted infection, though urinating immediately after intercourse can help prevent infections.  Drinking lots of fluids is also helpful to clear your current infection and prevent the next one.      I have sent a prescription for antibiotics to your pharmacy to treat this infection.    It is important that you take all of your prescribed medication even if your symptoms are improving after a few doses.  Taking all of your medicine helps prevent the symptoms from returning.     If your symptoms worsen, you develop pain in your back or stomach, develop fevers, or are not improving in 5 days, please contact your primary care provider for an appointment or visit any of our convenient Walk-in or Urgent Care Centers to be seen, which can be found on our website here.    Thanks again for choosing us as your health care partner,    Steph Pedraza,

## 2021-06-03 NOTE — TELEPHONE ENCOUNTER
Routing refill request to provider for review/approval because:  Drug not on the [facility] refill protocol     Guero Arriaga RN

## 2021-06-11 NOTE — PROGRESS NOTES
"ASSESSMENT & PLAN    Kayley was seen today for pain.    Diagnoses and all orders for this visit:    Iliotibial band syndrome of left side  -     [facility]; Future    Chronic pain of left knee  -     Orthopedic & Spine  Referral  -     XR Knee Standing AP Bilat [place] Bilat Lat Left; Future  -     [facility]; Future      This issue is chronic and Unchanged.    We discussed these other possible diagnosis: possible meniscal tear, patellar maltracking  We discussed the following treatment options: symptom treatment, activity modification/rest, imaging, rehab and referral. Following discussion, plan: will trial physical therapy, consider other options pending clinical course.    Plan:  - Today's Plan of Care:  Rehab: Physical Therapy: [facility] - [phone]  Continue with relative rest and activity modification, Ice, Compression, and Elevation.  Can apply ice 10-15 minutes 3-4 times per day as needed.    -We also discussed other future treatment options:  MRI left knee    Follow Up: 6 - 8 weeks    Concerning signs and symptoms were reviewed.  The patient expressed understanding of this management plan and all questions were answered at this time.    Thanks for the opportunity to participate in the care of this patient, I will keep you updated on their progress.    CC: Steph Davis MD [facility]  Sports Medicine Physician  [facility]    -----  Chief Complaint   Patient presents with     Left Knee - Pain       SUBJECTIVE  Kayley Godoy is a/an 50 year old female who is seen as a referral from Steph Pedraza DO for evaluation of left knee pain. Wanting to discuss possible exercises to decrease knee pain.    The patient is seen by themselves.    Onset: Chronic. Multiple years ago, worse in the last year  Location of Pain: Left lateral knee, will \"give out\" possible knee cap moving   Worsened by: kneeling, squatting  Better " "with: ibuprofen.  Treatments tried: ibuprofen  Associated symptoms: instability behind the patella, pain, hot to the touch with prolonged activity.    Orthopedic/Surgical history: chronic knee pain  Social History/Occupation:     No family history pertinent to patient's problem today.    REVIEW OF SYSTEMS:  Review of Systems  Skin: no bruising, yes swelling  Musculoskeletal: as above  Neurologic: no numbness, paresthesias  Remainder of review of systems is negative including constitutional, CV, pulmonary, GI, except as noted in HPI or medical history.    OBJECTIVE:  /82 (BP Location: Left arm, Patient Position: Sitting)   Ht 1.676 m (5' 6\")   Wt 82.6 kg (182 lb)   BMI 29.38 kg/m     General: healthy, alert and in no distress  HEENT: no scleral icterus or conjunctival erythema  Skin: no suspicious lesions or rash. No jaundice.  CV: distal perfusion intact  Resp: normal respiratory effort without conversational dyspnea   Psych: normal mood and affect  Gait: normal steady gait with appropriate coordination and balance  Neuro: Normal light sensory exam of lower extremity    Left Knee exam  Inspection:      no effusion, swelling of bruising bilateral    Patella:      Crepitus noted in the patellofemoral joint bilateral    Tender:      lateral joint line left       distal IT Band left    Non Tender:      remainder of knee area bilateral    Knee ROM:      Full active and passive ROM with flexion and extension bilateral    Hip ROM:     Full active and passive ROM bilateral    Strength:      5-/5 with hip abduction left       5/5 with hip flexion bilateral       5/5 with hip adduction bilateral    Special Tests:     neg (-) Rosalio left       neg (-) Lachmans left       neg (-) anterior drawer left       neg (-) posterior drawer left       neg (-) varus at 0 deg and 30 deg left       neg (-) valgus at 0 deg and 30 deg left    Flexibility:      positive (+) Clari's bilateral    Gait:      " normal    Neurovascular:      2+ peripheral pulses bilaterally and brisk capillary refill       sensation grossly intact    RADIOLOGY:  I independently ordered, visualized and reviewed these images with the patient  AP and sunrise bilateral and left lateral XR views of knees reviewed: no acute bony abnormality, no significant degenerative change  - will follow official read    Review of the result(s) of each unique test - XR

## 2021-06-14 ENCOUNTER — ANCILLARY PROCEDURE (OUTPATIENT)
Dept: GENERAL RADIOLOGY | Facility: CLINIC | Age: 51
End: 2021-06-14
Attending: PEDIATRICS
Payer: COMMERCIAL

## 2021-06-14 ENCOUNTER — OFFICE VISIT (OUTPATIENT)
Dept: ORTHOPEDICS | Facility: CLINIC | Age: 51
End: 2021-06-14
Attending: FAMILY MEDICINE
Payer: COMMERCIAL

## 2021-06-14 VITALS
BODY MASS INDEX: 29.25 KG/M2 | DIASTOLIC BLOOD PRESSURE: 82 MMHG | WEIGHT: 182 LBS | SYSTOLIC BLOOD PRESSURE: 106 MMHG | HEIGHT: 66 IN

## 2021-06-14 DIAGNOSIS — M76.32 ILIOTIBIAL BAND SYNDROME OF LEFT SIDE: Primary | ICD-10-CM

## 2021-06-14 DIAGNOSIS — G89.29 CHRONIC PAIN OF LEFT KNEE: ICD-10-CM

## 2021-06-14 DIAGNOSIS — M25.562 CHRONIC PAIN OF LEFT KNEE: ICD-10-CM

## 2021-06-14 PROCEDURE — 73562 X-RAY EXAM OF KNEE 3: CPT | Performed by: RADIOLOGY

## 2021-06-14 PROCEDURE — 99203 OFFICE O/P NEW LOW 30 MIN: CPT | Performed by: PEDIATRICS

## 2021-06-14 ASSESSMENT — PAIN SCALES - GENERAL: PAINLEVEL: MODERATE PAIN (4)

## 2021-06-14 ASSESSMENT — MIFFLIN-ST. JEOR: SCORE: 1452.3

## 2021-06-14 NOTE — LETTER
6/14/2021         RE: Kayley Godoy  [address] [facility] [address]        Dear Colleague,    Thank you for referring your patient, Kayley Godoy, to the [facility] [place]. Please see a copy of my visit note below.    ASSESSMENT & PLAN    Kayley was seen today for pain.    Diagnoses and all orders for this visit:    Iliotibial band syndrome of left side  -     [facility] REFERRAL; Future    Chronic pain of left knee  -     Orthopedic & Spine  Referral  -     XR Knee Standing AP Bilat [place] Bilat Lat Left; Future  -     [facility] REFERRAL; Future      This issue is chronic and Unchanged.    We discussed these other possible diagnosis: possible meniscal tear, patellar maltracking  We discussed the following treatment options: symptom treatment, activity modification/rest, imaging, rehab and referral. Following discussion, plan: will trial physical therapy, consider other options pending clinical course.    Plan:  - Today's Plan of Care:  Rehab: Physical Therapy: [place] [facility] - [phone]  Continue with relative rest and activity modification, Ice, Compression, and Elevation.  Can apply ice 10-15 minutes 3-4 times per day as needed.    -We also discussed other future treatment options:  MRI left knee    Follow Up: 6 - 8 weeks    Concerning signs and symptoms were reviewed.  The patient expressed understanding of this management plan and all questions were answered at this time.    Thanks for the opportunity to participate in the care of this patient, I will keep you updated on their progress.    CC: Steph Davis MD [facility]  Sports Medicine Physician  [facility]    -----  Chief Complaint   Patient presents with     Left Knee - Pain       SUBJECTIVE  Kayley Godoy is a/an 50 year old female who is seen as a referral from Steph Pedraza DO for evaluation of left knee pain. Wanting to discuss  "possible exercises to decrease knee pain.    The patient is seen by themselves.    Onset: Chronic. Multiple years ago, worse in the last year  Location of Pain: Left lateral knee, will \"give out\" possible knee cap moving   Worsened by: kneeling, squatting  Better with: ibuprofen.  Treatments tried: ibuprofen  Associated symptoms: instability behind the patella, pain, hot to the touch with prolonged activity.    Orthopedic/Surgical history: chronic knee pain  Social History/Occupation:     No family history pertinent to patient's problem today.    REVIEW OF SYSTEMS:  Review of Systems  Skin: no bruising, yes swelling  Musculoskeletal: as above  Neurologic: no numbness, paresthesias  Remainder of review of systems is negative including constitutional, CV, pulmonary, GI, except as noted in HPI or medical history.    OBJECTIVE:  /82 (BP Location: Left arm, Patient Position: Sitting)   Ht 1.676 m (5' 6\")   Wt 82.6 kg (182 lb)   BMI 29.38 kg/m     General: healthy, alert and in no distress  HEENT: no scleral icterus or conjunctival erythema  Skin: no suspicious lesions or rash. No jaundice.  CV: distal perfusion intact  Resp: normal respiratory effort without conversational dyspnea   Psych: normal mood and affect  Gait: normal steady gait with appropriate coordination and balance  Neuro: Normal light sensory exam of lower extremity    Left Knee exam  Inspection:      no effusion, swelling of bruising bilateral    Patella:      Crepitus noted in the patellofemoral joint bilateral    Tender:      lateral joint line left       distal IT Band left    Non Tender:      remainder of knee area bilateral    Knee ROM:      Full active and passive ROM with flexion and extension bilateral    Hip ROM:     Full active and passive ROM bilateral    Strength:      5-/5 with hip abduction left       5/5 with hip flexion bilateral       5/5 with hip adduction bilateral    Special Tests:     neg (-) Rosalio left    "    neg (-) Lachmans left       neg (-) anterior drawer left       neg (-) posterior drawer left       neg (-) varus at 0 deg and 30 deg left       neg (-) valgus at 0 deg and 30 deg left    Flexibility:      positive (+) Clari's bilateral    Gait:      normal    Neurovascular:      2+ peripheral pulses bilaterally and brisk capillary refill       sensation grossly intact    RADIOLOGY:  I independently ordered, visualized and reviewed these images with the patient  AP and sunrise bilateral and left lateral XR views of knees reviewed: no acute bony abnormality, no significant degenerative change  - will follow official read    Review of the result(s) of each unique test - XR             Again, thank you for allowing me to participate in the care of your patient.        Sincerely,        Keyonna Davis MD

## 2021-06-14 NOTE — RESULT ENCOUNTER NOTE
These results were discussed during office visit.    Keyonna Davis MD, CAQ  Primary Care Sports Medicine  [facility]

## 2021-06-23 ENCOUNTER — THERAPY VISIT (OUTPATIENT)
Dept: PHYSICAL THERAPY | Facility: CLINIC | Age: 51
End: 2021-06-23
Attending: PEDIATRICS
Payer: COMMERCIAL

## 2021-06-23 DIAGNOSIS — G89.29 CHRONIC PAIN OF LEFT KNEE: ICD-10-CM

## 2021-06-23 DIAGNOSIS — M25.562 CHRONIC PAIN OF LEFT KNEE: ICD-10-CM

## 2021-06-23 DIAGNOSIS — M76.32 ILIOTIBIAL BAND SYNDROME OF LEFT SIDE: ICD-10-CM

## 2021-06-23 PROCEDURE — 97161 PT EVAL LOW COMPLEX 20 MIN: CPT | Mod: GP | Performed by: PHYSICAL THERAPIST

## 2021-06-23 PROCEDURE — 97110 THERAPEUTIC EXERCISES: CPT | Mod: GP | Performed by: PHYSICAL THERAPIST

## 2021-06-23 ASSESSMENT — ACTIVITIES OF DAILY LIVING (ADL)
SQUAT: ACTIVITY IS MINIMALLY DIFFICULT
SWELLING: I DO NOT HAVE THE SYMPTOM
SIT WITH YOUR KNEE BENT: ACTIVITY IS NOT DIFFICULT
GO UP STAIRS: ACTIVITY IS NOT DIFFICULT
WALK: ACTIVITY IS NOT DIFFICULT
LIMPING: I DO NOT HAVE THE SYMPTOM
PAIN: THE SYMPTOM AFFECTS MY ACTIVITY SLIGHTLY
AS_A_RESULT_OF_YOUR_KNEE_INJURY,_HOW_WOULD_YOU_RATE_YOUR_CURRENT_LEVEL_OF_DAILY_ACTIVITY?: NEARLY NORMAL
KNEE_ACTIVITY_OF_DAILY_LIVING_SCORE: 90
KNEE_ACTIVITY_OF_DAILY_LIVING_SUM: 63
KNEEL ON THE FRONT OF YOUR KNEE: ACTIVITY IS SOMEWHAT DIFFICULT
RISE FROM A CHAIR: ACTIVITY IS NOT DIFFICULT
RAW_SCORE: 63
GO DOWN STAIRS: ACTIVITY IS NOT DIFFICULT
STAND: ACTIVITY IS NOT DIFFICULT
STIFFNESS: I DO NOT HAVE THE SYMPTOM
HOW_WOULD_YOU_RATE_THE_OVERALL_FUNCTION_OF_YOUR_KNEE_DURING_YOUR_USUAL_DAILY_ACTIVITIES?: NEARLY NORMAL
WEAKNESS: I DO NOT HAVE THE SYMPTOM
GIVING WAY, BUCKLING OR SHIFTING OF KNEE: THE SYMPTOM AFFECTS MY ACTIVITY SLIGHTLY
HOW_WOULD_YOU_RATE_THE_CURRENT_FUNCTION_OF_YOUR_KNEE_DURING_YOUR_USUAL_DAILY_ACTIVITIES_ON_A_SCALE_FROM_0_TO_100_WITH_100_BEING_YOUR_LEVEL_OF_KNEE_FUNCTION_PRIOR_TO_YOUR_INJURY_AND_0_BEING_THE_INABILITY_TO_PERFORM_ANY_OF_YOUR_USUAL_DAILY_ACTIVITIES?: 90

## 2021-06-23 NOTE — PROGRESS NOTES
[place] [facility] Initial Evaluation  6/23/2021     Precautions/Restrictions/MD instructions: eval and treat    Therapist Assessment: Kayley Godoy is a 50 year old female patient presenting to Physical Therapy with L knee and hip pain. Patient demonstrates dynamic valgus with increasing pinching feeling with step downs, squatting, and single leg squatting, decreased glute med and glute max strength bilaterally, increased lateral tracking to bilateral patellas, and tenderness to palpation along distal insertion of ITB on L. Signs and symptoms are consistent with L ITB syndrome. These impairments limit their ability to squat, kneel, and sit to stand without pain. Skilled PT services are necessary in order to reduce impairments and improve independent function.    Subjective History    Injury/Condition Details:  Presenting Complaint L knee and hip pain   Onset Timing/Date Many years, (Doctor's referral 6/14/2021)   Mechanism Insidious onset      Symptom Behavior Details    Primary Symptoms Sporadic symptoms; Activity/position dependent, pain (Location: lateral knee along ITB, Quality: Aching/Throbbing)       Sometimes will be walking and feel giving way, or instability.   Denies locking, catching,      Denies numbness, tingling, changes in sensation. Denies having related symptoms spreading to the L calf and L foot.    Worst Pain 6/10 (with kneeling)   Symptom Provocators Kneeling, and getting up from ground. Sitting in sitting position for prolonged periods of time   Best Pain 1/10    Symptom Relievers Rest, sitting down, ibuprofen   Time of day dependent? No   Recent symptom change? 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   Orthopaedic History  None reported by patient    Medication  Anti-depressants   Notable medical history Depression, anxiety, menopausal   Patient goals Decrease feeling of pain in L knee, walk more without increased pain   Patient Reported Health good   Red Flags: (Bold when present) - reviewed the following and denies  Malaise, unexplained weight loss, night pain, fever           PHYSICAL THERAPY KNEE EXAM     Dynamic Movement Screen:  DL Squat: Anterior knee translation, Knee valgus, Hip internal rotation and Improper use of glutes/hips  1 leg stance:   Right: proprioceptive challenge, excessive contralateral pelvic drop and excessive lateral trunk lean over stance limb  Left: proprioceptive challenge, excessive contralateral pelvic drop and increased femoral IR at stance limb    1 leg squat:   Right: proprioceptive challenge, excessive contralateral pelvic drop, excessive femoral IR/ADD and excessive anterior knee excursion  Left: proprioceptive challenge, excessive contralateral pelvic drop, excessive femoral IR/ADD and excessive anterior knee excursion    Gait: HIP/TRUNK and Decreased hip extension  Step: Anterior knee translation, Knee valgus, Hip internal rotation and Improper use of glutes/hips    (*Indicates patient s pain)  Knee PROM L  R   Hyperextension 0 0   Extension 0 0   Flexion 135 135   (*Indicates patient s pain)    Hip PROM:     L R   IR 30 30   ER 40 40   Clari's - -   Isaak - -       Special tests:   L R   Anterior Drawer - -   Posterior Drawer - -   Lachman's - -   Valgus 0 degrees - -   Valgus 30 degrees - -   Varus 0 degrees - -   Varus 30 degrees - -   Rosalio's - -   Appley's - -   Lateral Compression + +   Patellar Compression + +   (*Indicates patient's pain)    Resisted Tests Strength (MMT)    L R   Knee Ext 4/5 4/5   Knee Flex 4/5 4/5   Hip ABD 3+/5 3+/5   Hip Flex 4/5 4/5   Hip EXT 3+/5 3+/5   (*Indicates patient s pain)    Patellar tracking: bilaterally, patient has increased excessive lateral tracking of patellas      Other:  Joint Line Swelling: n/a  Knee Joint Effusion (Stroke Test Assessment):  Right: 0  Left: 0  ASSESSMENT/PLAN:  The patient is a 50 year old female with chief complaint of L knee and hip pain.    The patient has the following significant findings with corresponding treatment plan.  Diagnosis 1:  Signs and symptoms consistent with L ITB syndrome     Pain -  manual therapy, splint/taping/bracing/orthotics, self management, education and home program  Decreased ROM/flexibility - manual therapy, therapeutic exercise and home program  Decreased joint mobility - manual therapy, therapeutic exercise and home program  Decreased strength - therapeutic exercise, therapeutic activities and home program  Impaired balance - neuro re-education, therapeutic activities and home program  Decreased proprioception - neuro re-education and therapeutic activities  Impaired gait - gait training and assistive devices  Impaired muscle performance - neuro re-education and home program  Decreased function - therapeutic activities and home program  Impaired posture - neuro re-education, therapeutic activities and home program  Instability -  Therapeutic Activity, Therapeutic Exercise, Neuromuscular Re-education, Splinting/Taping/Bracing/Orthotic, home program      Therapy Evaluation Codes:   1) History comprised of:   Personal factors that impact the plan of care:      Time since onset of symptoms.    Comorbidity factors that impact the plan of care are:      Depression and Menopausal.     Medications impacting care: Anti-depressant.  2) Examination of Body Systems comprised of:   Body structures and functions that impact the plan of care:      Hip and Knee.   Activity limitations that impact the plan of care are:      Squatting/kneeling.  3) Clinical presentation characteristics are:   Stable/Uncomplicated.  4) Decision-Making    Low complexity using standardized patient assessment instrument and/or measureable assessment of  functional outcome.  Cumulative Therapy Evaluation is: Low complexity.    Previous and current functional limitations:  (See Goal Flow Sheet for this information)    Short term and Long term goals: (See Goal Flow Sheet for this information)     Communication ability:  Patient appears to be able to clearly communicate and understand verbal and written communication and follow directions correctly.  Treatment Explanation - The following has been discussed with the patient:   RX ordered/plan of care  Anticipated outcomes  Possible risks and side effects  This patient would benefit from PT intervention to resume normal activities.   Rehab potential is good.    Frequency:  1 X week, once daily  Duration:  for 6 visits  Discharge Plan: Achieve all LTGs, be independent in home treatment program, and reach maximal therapeutic benefit.    Please refer to the daily flowsheet for treatment today, total treatment time and time spent performing 1:1 timed codes.

## 2021-07-21 ENCOUNTER — MYC REFILL (OUTPATIENT)
Dept: FAMILY MEDICINE | Facility: CLINIC | Age: 51
End: 2021-07-21

## 2021-07-21 DIAGNOSIS — G47.00 INSOMNIA, UNSPECIFIED TYPE: ICD-10-CM

## 2021-07-23 RX ORDER — ZOLPIDEM TARTRATE 5 MG/1
5 TABLET ORAL
Qty: 10 TABLET | Refills: 0 | Status: SHIPPED | OUTPATIENT
Start: 2021-07-23 | End: 2021-12-01

## 2021-08-16 ENCOUNTER — MYC MEDICAL ADVICE (OUTPATIENT)
Dept: FAMILY MEDICINE | Facility: CLINIC | Age: 51
End: 2021-08-16

## 2021-08-16 DIAGNOSIS — N95.1 VASOMOTOR SYMPTOMS DUE TO MENOPAUSE: ICD-10-CM

## 2021-08-16 RX ORDER — VENLAFAXINE HYDROCHLORIDE 37.5 MG/1
CAPSULE, EXTENDED RELEASE ORAL
Qty: 180 CAPSULE | Refills: 3 | Status: SHIPPED | OUTPATIENT
Start: 2021-08-16 | End: 2021-11-30

## 2021-08-16 NOTE — TELEPHONE ENCOUNTER
Routing refill request to provider for review/approval because:  Patient is having dizziness. See 8/16/21 MyChart encounter.  PHQ 8/17/2020 1/4/2021 8/16/2021   PHQ-9 Total Score 12 3 1   Q9: Thoughts of better off dead/self-harm past 2 weeks Not at all Not at all Not at all     SARINA-7 SCORE 9/15/2017 4/26/2019 8/16/2021   Total Score - - -   Total Score - - 0 (minimal anxiety)   Total Score 2 0 0       Thank you.  Raúl Zimmerman, RN

## 2021-08-16 NOTE — TELEPHONE ENCOUNTER
Dr. Pedraza, Please review this encounter's MyChart communication. Your schedule is full tomorrow. How do you recommend management of this patient with dizziness?  Thank you.  Raúl Zimmerman RN

## 2021-08-17 NOTE — TELEPHONE ENCOUNTER
She should have a visit.  I have already overbooked my Tuesday and Thursday schedules and will not be able to work her in.  Please try to help her find an open appointment wherever she would like to be seen.  Okay for clinic    Steph Pedraza, DO

## 2021-08-18 NOTE — TELEPHONE ENCOUNTER
Chart indicates that Kayley cancelled the 2 PM appointment today with Dr. Le.  Raúl Zimmerman RN

## 2021-08-26 PROBLEM — M76.32 ILIOTIBIAL BAND SYNDROME OF LEFT SIDE: Status: RESOLVED | Noted: 2021-06-23 | Resolved: 2021-08-26

## 2021-08-26 PROBLEM — M25.562 CHRONIC PAIN OF LEFT KNEE: Status: RESOLVED | Noted: 2021-06-23 | Resolved: 2021-08-26

## 2021-08-26 PROBLEM — G89.29 CHRONIC PAIN OF LEFT KNEE: Status: RESOLVED | Noted: 2021-06-23 | Resolved: 2021-08-26

## 2021-08-26 NOTE — PROGRESS NOTES
Discharge Note    Progress reporting period is from initial evaluation date (please see noted date below) to Jun 23, 2021.  Linked Episodes   Type: Episode: Status: Noted: Resolved: Last update: Updated by:   PHYSICAL THERAPY L knee and hip pain 6/23/2021 Active 6/23/2021 6/23/2021  4:30 PM Kriss Brown, PT      Comments:       Kayley failed to follow up and current status is unknown.  Please see information below for last relevant information on current status.  Patient seen for 1 visits.    SUBJECTIVE  Subjective changes noted by patient:  see eval   .  Current pain level is  .     Previous pain level was   .   Changes in function:  Yes (See Goal flowsheet attached for changes in current functional level)  Adverse reaction to treatment or activity: None    OBJECTIVE  Changes noted in objective findings: see eval      ASSESSMENT/PLAN  Diagnosis: L ITB syndrome    Updated problem list and treatment plan:   Pain - HEP  Decreased ROM/flexibility - HEP  Decreased function - HEP  Decreased strength - HEP  STG/LTGs have been met or progress has been made towards goals:  Yes, please see goal flowsheet for most current information  Assessment of Progress: current status is unknown.    Last current status:     Self Management Plans:  HEP  I have re-evaluated this patient and find that the nature, scope, duration and intensity of the therapy is appropriate for the medical condition of the patient.  Kayley continues to require the following intervention to meet STG and LTG's:  HEP.    Recommendations:  Discharge with current home program.  Patient to follow up with MD as needed.    Please refer to the daily flowsheet for treatment today, total treatment time and time spent performing 1:1 timed codes.

## 2021-09-18 ENCOUNTER — HEALTH MAINTENANCE LETTER (OUTPATIENT)
Age: 51
End: 2021-09-18

## 2021-11-30 ENCOUNTER — VIRTUAL VISIT (OUTPATIENT)
Dept: FAMILY MEDICINE | Facility: CLINIC | Age: 51
End: 2021-11-30
Payer: COMMERCIAL

## 2021-11-30 DIAGNOSIS — H81.10 BENIGN PAROXYSMAL POSITIONAL VERTIGO, UNSPECIFIED LATERALITY: Primary | ICD-10-CM

## 2021-11-30 DIAGNOSIS — L30.9 DERMATITIS: ICD-10-CM

## 2021-11-30 DIAGNOSIS — N95.1 VASOMOTOR SYMPTOMS DUE TO MENOPAUSE: ICD-10-CM

## 2021-11-30 DIAGNOSIS — Z12.31 ENCOUNTER FOR SCREENING MAMMOGRAM FOR BREAST CANCER: ICD-10-CM

## 2021-11-30 DIAGNOSIS — G47.00 INSOMNIA, UNSPECIFIED TYPE: ICD-10-CM

## 2021-11-30 PROCEDURE — 99214 OFFICE O/P EST MOD 30 MIN: CPT | Mod: 95 | Performed by: FAMILY MEDICINE

## 2021-11-30 NOTE — PROGRESS NOTES
Kayley is a 51 year old who is being evaluated via a billable video visit.      How would you like to obtain your AVS? MyChart  If the video visit is dropped, the invitation should be resent by: Will anyone else be joining your video visit?    Video Start Time: 5:40 PM    A/P:      ICD-10-CM    1. Benign paroxysmal positional vertigo, unspecified laterality  H81.10 Physical Therapy Referral   2. Dermatitis  L30.9    3. Vasomotor symptoms due to menopause  N95.1 venlafaxine (EFFEXOR-XR) 37.5 MG 24 hr capsule   4. Insomnia, unspecified type  G47.00 zolpidem (AMBIEN) 5 MG tablet   5. Encounter for screening mammogram for breast cancer  Z12.31 *MA Screening Digital Bilateral     BPPV:  Symptoms classic, pt has been unable to resolve at home.  Rehab referral placed.    Dermatitis:  ? Contact.  Advised to to stop makeup, wash with mild cleanser and apply good facial moisturizer daily.  She should reach out if not improved in 1-2 weeks.  Consider dermatology referral    Vasomotor symptoms:  Controlled, continue med    Insomnia:  Periodic ambien with travel      Subjective   Kayley is a 51 year old who presents for the following health issues   HPI     * rash on face, comes and goes for the last 3 months    Off and on since school started in September.  Not wearing a mask regularly.  Feels like skin gets puffy and flaky under the inner corner of her eyes.  Does not wear a lot of makeup.  No new brands.  Did just recently replace her foundation.   Feels like it is there all the time but waxes and wanes.  Not really itchy or uncomfortable but can get dry and flaky.  Likens it to eczema.    Washes her face and occasionally applies hand lotion to her face but no particular treatments tried.      * dizziness for the last 2 months    Noticed it if she looks up and to the R head would spin.  Started a few months ago.  Tried home exercises for canalith repositioning but will not resolve.  Now still having symptoms which are bothersome.  Noticed brief spinning sensation when looking up or if she turns her head quickly.  No syncope or presyncope.  Symptoms are consistent with her previous BPPV episodes.          Review of Systems   Constitutional, HEENT, cardiovascular, pulmonary, gi and gu systems are negative, except as otherwise noted.      Objective           Vitals:  No vitals were obtained today due to virtual visit.    Physical Exam   GENERAL: Healthy, alert and no distress  EYES: Eyes grossly normal to inspection.  No discharge or erythema, or obvious scleral/conjunctival abnormalities.  RESP: No audible wheeze, cough, or visible cyanosis.  No visible retractions or increased work of breathing.    SKIN: picture quality difficult but skin under eyes appears mildly darker and dry, no specific rash noted.  NEURO: Cranial nerves grossly intact.  Mentation and speech appropriate for age.  PSYCH: Mentation appears normal, affect normal/bright, judgement and insight intact, normal speech and appearance well-groomed.    Epic reviewed          Video-Visit Details    Type of service:  Video Visit    Video End Time:5:52 PM    Originating Location (pt. Location): Home    Distant Location (provider location):  [facility]     Platform used for Video Visit: [employer]    Answers for HPI/ROS submitted by the patient on 11/23/2021  How many servings of fruits and vegetables do you eat daily?: 2-3  On average, how many sweetened beverages do you drink each day (Examples: soda, juice, sweet tea, etc.  Do NOT count diet or artificially sweetened beverages)?: 2  How many days a week do you exercise enough to make your heart beat faster?: 3 or less  How many days per week do you miss taking your medication?: 0

## 2021-12-01 RX ORDER — ZOLPIDEM TARTRATE 5 MG/1
5 TABLET ORAL
Qty: 10 TABLET | Refills: 0 | Status: SHIPPED | OUTPATIENT
Start: 2021-12-01 | End: 2022-05-10

## 2021-12-01 RX ORDER — VENLAFAXINE HYDROCHLORIDE 37.5 MG/1
CAPSULE, EXTENDED RELEASE ORAL
Qty: 180 CAPSULE | Refills: 3 | Status: SHIPPED | OUTPATIENT
Start: 2021-12-01 | End: 2022-12-15 | Stop reason: DRUGHIGH

## 2021-12-14 ENCOUNTER — HOSPITAL ENCOUNTER (OUTPATIENT)
Dept: PHYSICAL THERAPY | Facility: CLINIC | Age: 51
Setting detail: THERAPIES SERIES
End: 2021-12-14
Attending: FAMILY MEDICINE
Payer: COMMERCIAL

## 2021-12-14 DIAGNOSIS — H81.10 BENIGN PAROXYSMAL POSITIONAL VERTIGO, UNSPECIFIED LATERALITY: ICD-10-CM

## 2021-12-14 PROCEDURE — 97161 PT EVAL LOW COMPLEX 20 MIN: CPT | Mod: GP | Performed by: PHYSICAL THERAPIST

## 2021-12-14 PROCEDURE — 95992 CANALITH REPOSITIONING PROC: CPT | Mod: GP | Performed by: PHYSICAL THERAPIST

## 2021-12-14 NOTE — PROGRESS NOTES
Kayley Godoy   PHYSICAL THERAPY EVALUATION    12/14/21 1400   Quick Adds   Quick Adds Vestibular Eval   Type of Visit Initial OP PT Evaluation   General Information   Start of Care Date 12/14/21   Referring Physician DR Pedraza   Orders Evaluate and Treat as Indicated   Order Date 11/30/21   Medical Diagnosis BPPV   Onset of illness/injury or Date of Surgery 08/16/21   Pertinent history of current problem (include personal factors and/or comorbidities that impact the POC) Dizziness started in August 2021, has had episodes prior and had PT. Sx increase looking up, roll to L and back to center.     Prior level of function comment indep, teacher   Patient role/Employment history Employed   Patient/Family Goals Statement get rid of this   Fall Risk Screen   Fall screen completed by PT   Have you fallen 2 or more times in the past year? No   Have you fallen and had an injury in the past year? No   Is patient a fall risk? No   Gait   Gait Comments gait is normal, gait with head turns normal, pivots very fast, normal   Balance Special Tests   Balance Special Tests Modified CTSIB Conditions   Balance Special Tests Modified CTSIB Conditions   Condition 1, seconds 30 Seconds   Condition 2, seconds 30 Seconds   Cervicogenic Screen   Neck ROM WFL   Vertebral Artery Test Normal   Transverse Ligament Test Normal   Oculomotor Exam   Smooth Pursuit Normal   Saccades Normal   VOR Normal   Infrared Goggle Exam or Frenzel Lense Exam   Vestibular Suppressant in Last 24 Hours? No   Exam completed with Room Light   Spontaneous Nystagmus Negative   Henderson-Hallpike (right) Upbeating R torsional   Henderson-Hallpike (right) comments lasting 7-8 seconds, fast beating   Ann-Hallpike (Left) Negative   BPPV Canal(s) R Posterior   BPPV Type Canalithasis   Planned Therapy Interventions   Planned Therapy Interventions neuromuscular re-education   Clinical Impression   Criteria for Skilled Therapeutic Interventions Met yes, treatment indicated   PT  Diagnosis R posterior canal BPPV   Functional limitations due to impairments rolling in bed, looking up   Clinical Presentation Stable/Uncomplicated   Clinical Decision Making (Complexity) Low complexity   Therapy Frequency 1 time/week   Predicted Duration of Therapy Intervention (days/wks) 1x planned, will hold open should sx return   Risk & Benefits of therapy have been explained Yes   Patient, Family & other staff in agreement with plan of care Yes   Education Assessment   Preferred Learning Style Listening   Barriers to Learning No barriers   GOALS   PT Eval Goals 1   Goal 1   Goal Identifier 1   Goal Description able to roll in bed and look up without dizziness in 4wk   Target Date 01/11/22   Total Evaluation Time   PT Eval, Low Complexity Minutes (99730) 15   [facility]  Kris Hoenk  PT  [facility]  [address] [place] [address]  [email or web address]   Office: [phone]  Voicemail: [phone]

## 2022-02-11 ENCOUNTER — HOSPITAL ENCOUNTER (OUTPATIENT)
Dept: MAMMOGRAPHY | Facility: CLINIC | Age: 52
Discharge: HOME | End: 2022-02-11
Attending: FAMILY MEDICINE | Admitting: FAMILY MEDICINE
Payer: COMMERCIAL

## 2022-02-11 DIAGNOSIS — Z12.31 ENCOUNTER FOR SCREENING MAMMOGRAM FOR BREAST CANCER: ICD-10-CM

## 2022-02-11 PROCEDURE — 77067 SCR MAMMO BI INCL CAD: CPT

## 2022-04-30 ENCOUNTER — HEALTH MAINTENANCE LETTER (OUTPATIENT)
Age: 52
End: 2022-04-30

## 2022-05-10 ENCOUNTER — MYC REFILL (OUTPATIENT)
Dept: FAMILY MEDICINE | Facility: CLINIC | Age: 52
End: 2022-05-10
Payer: COMMERCIAL

## 2022-05-10 DIAGNOSIS — G47.00 INSOMNIA, UNSPECIFIED TYPE: ICD-10-CM

## 2022-05-10 NOTE — TELEPHONE ENCOUNTER
Routing refill request to provider for review/approval because:  Drug not on the [facility] refill protocol   Thank you.  Raúl Zimmerman RN

## 2022-05-11 RX ORDER — ZOLPIDEM TARTRATE 5 MG/1
5 TABLET ORAL
Qty: 10 TABLET | Refills: 0 | Status: SHIPPED | OUTPATIENT
Start: 2022-05-11 | End: 2022-06-24

## 2022-06-22 DIAGNOSIS — G47.00 INSOMNIA, UNSPECIFIED TYPE: ICD-10-CM

## 2022-06-24 RX ORDER — ZOLPIDEM TARTRATE 5 MG/1
TABLET ORAL
Qty: 10 TABLET | Refills: 0 | Status: SHIPPED | OUTPATIENT
Start: 2022-06-24 | End: 2022-08-25

## 2022-07-26 ENCOUNTER — E-VISIT (OUTPATIENT)
Dept: FAMILY MEDICINE | Facility: CLINIC | Age: 52
End: 2022-07-26
Payer: COMMERCIAL

## 2022-07-26 DIAGNOSIS — F41.9 ANXIETY: Primary | ICD-10-CM

## 2022-07-26 PROCEDURE — 99421 OL DIG E/M SVC 5-10 MIN: CPT | Performed by: FAMILY MEDICINE

## 2022-07-26 ASSESSMENT — ANXIETY QUESTIONNAIRES
7. FEELING AFRAID AS IF SOMETHING AWFUL MIGHT HAPPEN: NOT AT ALL
8. IF YOU CHECKED OFF ANY PROBLEMS, HOW DIFFICULT HAVE THESE MADE IT FOR YOU TO DO YOUR WORK, TAKE CARE OF THINGS AT HOME, OR GET ALONG WITH OTHER PEOPLE?: SOMEWHAT DIFFICULT
1. FEELING NERVOUS, ANXIOUS, OR ON EDGE: SEVERAL DAYS
3. WORRYING TOO MUCH ABOUT DIFFERENT THINGS: SEVERAL DAYS
7. FEELING AFRAID AS IF SOMETHING AWFUL MIGHT HAPPEN: NOT AT ALL
4. TROUBLE RELAXING: SEVERAL DAYS
GAD7 TOTAL SCORE: 4
GAD7 TOTAL SCORE: 4
6. BECOMING EASILY ANNOYED OR IRRITABLE: SEVERAL DAYS
5. BEING SO RESTLESS THAT IT IS HARD TO SIT STILL: NOT AT ALL
2. NOT BEING ABLE TO STOP OR CONTROL WORRYING: NOT AT ALL
GAD7 TOTAL SCORE: 4

## 2022-07-26 NOTE — TELEPHONE ENCOUNTER
Provider E-Visit time total (minutes): 4 min    Pt has not responded back.  Initial advice given.  Will close encounter    Steph Pedraza DO

## 2022-07-27 ASSESSMENT — ANXIETY QUESTIONNAIRES: GAD7 TOTAL SCORE: 4

## 2022-08-23 DIAGNOSIS — G47.00 INSOMNIA, UNSPECIFIED TYPE: ICD-10-CM

## 2022-08-25 RX ORDER — ZOLPIDEM TARTRATE 5 MG/1
TABLET ORAL
Qty: 10 TABLET | Refills: 0 | Status: SHIPPED | OUTPATIENT
Start: 2022-08-25 | End: 2022-12-14

## 2022-11-19 ENCOUNTER — HEALTH MAINTENANCE LETTER (OUTPATIENT)
Age: 52
End: 2022-11-19

## 2022-12-08 ASSESSMENT — ENCOUNTER SYMPTOMS
NAUSEA: 0
HEARTBURN: 0
DIARRHEA: 0
ARTHRALGIAS: 0
MYALGIAS: 0
DYSURIA: 0
PARESTHESIAS: 0
HEADACHES: 1
HEMATOCHEZIA: 0
WEAKNESS: 0
PALPITATIONS: 0
SHORTNESS OF BREATH: 0
BREAST MASS: 0
CONSTIPATION: 0
SORE THROAT: 0
ABDOMINAL PAIN: 0
JOINT SWELLING: 0
COUGH: 0
EYE PAIN: 0
FREQUENCY: 0
FEVER: 0
CHILLS: 0
NERVOUS/ANXIOUS: 1
HEMATURIA: 0
DIZZINESS: 0

## 2022-12-12 DIAGNOSIS — G47.00 INSOMNIA, UNSPECIFIED TYPE: ICD-10-CM

## 2022-12-14 RX ORDER — ZOLPIDEM TARTRATE 5 MG/1
TABLET ORAL
Qty: 10 TABLET | Refills: 0 | Status: SHIPPED | OUTPATIENT
Start: 2022-12-14 | End: 2023-02-14

## 2022-12-15 ENCOUNTER — OFFICE VISIT (OUTPATIENT)
Dept: FAMILY MEDICINE | Facility: CLINIC | Age: 52
End: 2022-12-15
Payer: COMMERCIAL

## 2022-12-15 VITALS
TEMPERATURE: 98.2 F | WEIGHT: 179.2 LBS | BODY MASS INDEX: 28.8 KG/M2 | OXYGEN SATURATION: 99 % | HEART RATE: 84 BPM | DIASTOLIC BLOOD PRESSURE: 70 MMHG | HEIGHT: 66 IN | SYSTOLIC BLOOD PRESSURE: 120 MMHG

## 2022-12-15 DIAGNOSIS — Z00.00 ROUTINE GENERAL MEDICAL EXAMINATION AT A HEALTH CARE FACILITY: Primary | ICD-10-CM

## 2022-12-15 DIAGNOSIS — Z13.220 SCREENING FOR HYPERLIPIDEMIA: ICD-10-CM

## 2022-12-15 DIAGNOSIS — F41.9 ANXIETY: ICD-10-CM

## 2022-12-15 DIAGNOSIS — Z12.83 SCREENING FOR SKIN CANCER: ICD-10-CM

## 2022-12-15 PROCEDURE — 99396 PREV VISIT EST AGE 40-64: CPT | Mod: 25 | Performed by: FAMILY MEDICINE

## 2022-12-15 PROCEDURE — 91312 COVID-19 VACCINE BIVALENT BOOSTER 12+ (PFIZER): CPT | Performed by: FAMILY MEDICINE

## 2022-12-15 PROCEDURE — 90682 RIV4 VACC RECOMBINANT DNA IM: CPT | Performed by: FAMILY MEDICINE

## 2022-12-15 PROCEDURE — 90471 IMMUNIZATION ADMIN: CPT | Performed by: FAMILY MEDICINE

## 2022-12-15 PROCEDURE — 99213 OFFICE O/P EST LOW 20 MIN: CPT | Mod: 25 | Performed by: FAMILY MEDICINE

## 2022-12-15 PROCEDURE — 0124A COVID-19 VACCINE BIVALENT BOOSTER 12+ (PFIZER): CPT | Performed by: FAMILY MEDICINE

## 2022-12-15 RX ORDER — VENLAFAXINE HYDROCHLORIDE 150 MG/1
150 CAPSULE, EXTENDED RELEASE ORAL DAILY
Qty: 30 CAPSULE | Refills: 1 | Status: SHIPPED | OUTPATIENT
Start: 2022-12-15 | End: 2023-02-03

## 2022-12-15 ASSESSMENT — PATIENT HEALTH QUESTIONNAIRE - PHQ9
SUM OF ALL RESPONSES TO PHQ QUESTIONS 1-9: 10
SUM OF ALL RESPONSES TO PHQ QUESTIONS 1-9: 10
10. IF YOU CHECKED OFF ANY PROBLEMS, HOW DIFFICULT HAVE THESE PROBLEMS MADE IT FOR YOU TO DO YOUR WORK, TAKE CARE OF THINGS AT HOME, OR GET ALONG WITH OTHER PEOPLE: SOMEWHAT DIFFICULT

## 2022-12-15 ASSESSMENT — ENCOUNTER SYMPTOMS
HEMATOCHEZIA: 0
MYALGIAS: 0
EYE PAIN: 0
PARESTHESIAS: 0
FEVER: 0
HEARTBURN: 0
SHORTNESS OF BREATH: 0
FREQUENCY: 0
DIARRHEA: 0
HEMATURIA: 0
CONSTIPATION: 0
BREAST MASS: 0
DYSURIA: 0
PALPITATIONS: 0
COUGH: 0
DIZZINESS: 0
ABDOMINAL PAIN: 0
ARTHRALGIAS: 0
WEAKNESS: 0
NERVOUS/ANXIOUS: 1
JOINT SWELLING: 0
CHILLS: 0
NAUSEA: 0
HEADACHES: 1
SORE THROAT: 0

## 2022-12-15 NOTE — PROGRESS NOTES
"Answers for HPI/ROS submitted by the patient on 12/15/2022  If you checked off any problems, how difficult have these problems made it for you to do your work, take care of things at home, or get along with other people?: Somewhat difficult  PHQ9 TOTAL SCORE: 10       SUBJECTIVE:   CC: Kayley is an 52 year old who presents for preventive health visit.     Patient has been advised of split billing requirements and indicates understanding: Yes  Healthy Habits:     Getting at least 3 servings of Calcium per day:  NO    Bi-annual eye exam:  Yes    Dental care twice a year:  NO    Sleep apnea or symptoms of sleep apnea:  None    Diet:  Regular (no restrictions)    Frequency of exercise:  1 day/week    Duration of exercise:  N/A    Taking medications regularly:  Yes    Medication side effects:  Not applicable    PHQ-2 Total Score: 1    Additional concerns today:  Yes    Concerns:  * memory concerns  Mom and Dad with dementia.  Working on managing them in their home.  Currently adding care at home to off load some of the stress.      Feels like she is good at work but \"not present\" with her family.  Feels it is due to her brain being overloaded with other things.    Feels like she is not as good at remembering things as she used to be.  This makes her worry about dementia due to her parents history    Wonders if this is due to her hormonal changes with menopause.    Has increased her venlafaxine to 2 daily and feels this has helped her to feel calmer.  Still feels short with her .   Feels like she \"picks fights\"     * menopause - wonders if all of the above is related to hormonal changes.        Today's PHQ-2 Score:   PHQ-2 ( 1999 [employer]) 12/8/2022   Q1: Little interest or pleasure in doing things 1   Q2: Feeling down, depressed or hopeless 0   PHQ-2 Score 1   Q1: Little interest or pleasure in doing things Several days   Q2: Feeling down, depressed or hopeless Not at all   PHQ-2 Score 1       Have you ever done " "Advance Care Planning? (For example, a Health Directive, POLST, or a discussion with a medical provider or your loved ones about your wishes):     Social History     Tobacco Use     Smoking status: Former     Packs/day: 0.50     Years: 15.00     Pack years: 7.50     Types: Cigarettes     Smokeless tobacco: Never   Substance Use Topics     Alcohol use: Yes     Comment: rarely         Alcohol Use 12/8/2022   Prescreen: >3 drinks/day or >7 drinks/week? No   Prescreen: >3 drinks/day or >7 drinks/week? -       Reviewed orders with patient.  Reviewed health maintenance and updated orders accordingly - Yes      Breast Cancer Screening:    Breast CA Risk Assessment (FHS-7) 12/8/2022   Do you have a family history of breast, colon, or ovarian cancer? No / Unknown         Mammogram Screening: Recommended annual mammography  Pertinent mammograms are reviewed under the imaging tab.    History of abnormal Pap smear: YES - updated in Problem List and Health Maintenance accordingly  PAP / HPV Latest Ref Rng & Units 4/26/2019 4/29/2016 6/1/2012   PAP (Historical) - NIL ASC-US(A) NIL   HPV16 NEG:Negative Negative Negative -   HPV18 NEG:Negative Negative Negative -   HRHPV NEG:Negative Negative Negative -     Reviewed and updated as needed this visit by clinical staff                  Reviewed and updated as needed this visit by Provider                 Past Medical History:   Diagnosis Date     Abnormal Pap smear of cervix \"In her 20's\"    Had treatment with \"laser\".  Believes it to have been a CIN3 lesion.  Had repeat biopsies and all normal follow up     ASCUS favor benign 4/29/16    Neg HPV     Tubular adenoma of colon       Past Surgical History:   Procedure Laterality Date     CL AFF SURGICAL PATHOLOGY  1993     COLONOSCOPY N/A 7/31/2019    Procedure: Colonoscopy, With Polypectomy And Biopsy;  Surgeon: Dagoberto Ho DO;  Location: [facility]     SURGICAL HISTORY OF -   2005    mole removal left upper chest     SURGICAL " HISTORY OF -   2008    endometrial ablation     ZZC APPENDECTOMY  age 13       Review of Systems   Constitutional: Negative for chills and fever.   HENT: Negative for congestion, ear pain, hearing loss and sore throat.    Eyes: Negative for pain and visual disturbance.   Respiratory: Negative for cough and shortness of breath.    Cardiovascular: Negative for chest pain, palpitations and peripheral edema.   Gastrointestinal: Negative for abdominal pain, constipation, diarrhea, heartburn, hematochezia and nausea.   Breasts:  Negative for tenderness, breast mass and discharge.   Genitourinary: Negative for dysuria, frequency, genital sores, hematuria, pelvic pain, urgency, vaginal bleeding and vaginal discharge.   Musculoskeletal: Negative for arthralgias, joint swelling and myalgias.   Skin: Negative for rash.   Neurological: Positive for headaches. Negative for dizziness, weakness and paresthesias.   Psychiatric/Behavioral: Positive for mood changes. The patient is nervous/anxious.           OBJECTIVE:   There were no vitals taken for this visit.  Physical Exam  GENERAL: healthy, alert and no distress  EYES: Eyes grossly normal to inspection, PERRL and conjunctivae and sclerae normal  NECK: no adenopathy, no asymmetry, masses, or scars and thyroid normal to palpation  RESP: lungs clear to auscultation - no rales, rhonchi or wheezes  CV: regular rate and rhythm, normal S1 S2, no S3 or S4, no murmur, click or rub, no peripheral edema and peripheral pulses strong  ABDOMEN: soft, nontender, no hepatosplenomegaly, no masses and bowel sounds normal  MS: no gross musculoskeletal defects noted, no edema  NEURO: Normal strength and tone, mentation intact and speech normal  PSYCH: mentation appears normal, affect normal/bright    Diagnostic Test Results:  Labs reviewed in Epic    ASSESSMENT/PLAN:       ICD-10-CM    1. Routine general medical examination at a health care facility  Z00.00       2. Anxiety  F41.9 venlafaxine  (EFFEXOR XR) 150 MG 24 hr capsule      3. Screening for hyperlipidemia  Z13.220       4. Screening for skin cancer  Z12.83 Adult Dermatology Referral        Anxiety:  Has increased.  Unclear if hormonal but under a lot of stress as well.  Has had benefit from venlafaxine dose.  Plan trial of increased dose.  Reviewed side effects and time to see benefit.  F/u 4-6 weeks, sooner as needed.  Offered neuropsych testing.  Pt declines, prefers to see how things go with better anxiety management       Patient has been advised of split billing requirements and indicates understanding: Yes      COUNSELING:  Reviewed preventive health counseling, as reflected in patient instructions        She reports that she has quit smoking. Her smoking use included cigarettes. She has a 7.50 pack-year smoking history. She has never used smokeless tobacco.          Steph Pedraza DO  [facility]

## 2022-12-15 NOTE — NURSING NOTE
"Initial /70   Pulse 84   Temp 98.2  F (36.8  C) (Tympanic)   Ht 1.664 m (5' 5.5\")   Wt 81.3 kg (179 lb 3.2 oz)   SpO2 99%   BMI 29.37 kg/m   Estimated body mass index is 29.37 kg/m  as calculated from the following:    Height as of this encounter: 1.664 m (5' 5.5\").    Weight as of this encounter: 81.3 kg (179 lb 3.2 oz). .      "

## 2022-12-28 ENCOUNTER — E-VISIT (OUTPATIENT)
Dept: FAMILY MEDICINE | Facility: CLINIC | Age: 52
End: 2022-12-28
Payer: COMMERCIAL

## 2022-12-28 DIAGNOSIS — R30.0 DYSURIA: Primary | ICD-10-CM

## 2022-12-28 PROCEDURE — 99421 OL DIG E/M SVC 5-10 MIN: CPT | Performed by: FAMILY MEDICINE

## 2022-12-28 NOTE — PATIENT INSTRUCTIONS
Dear Kayley Godoy,     After reviewing your responses, I would like you to come in for a urine test to make sure we treat you correctly. This urine test is to evaluate you for a possible urinary tract infection, and should be scheduled for today or tomorrow. Schedule a Lab Only appointment here.     Lab appointments are not available at most locations on the weekends. If no Lab Only appointment is available, you should be seen in any of our convenient Walk-in or Urgent Care Centers, which can be found on our website here.     You will receive instructions with your results in [facility] once they are available.     If your symptoms worsen, you develop pain in your back or stomach, develop fevers, or are not improving in 5 days, please contact your primary care provider for an appointment or visit a Walk-in or Urgent Care Center to be seen.     Thanks again for choosing us as your health care partner,     Steph Pedraza, DO

## 2023-01-11 DIAGNOSIS — N95.1 VASOMOTOR SYMPTOMS DUE TO MENOPAUSE: ICD-10-CM

## 2023-01-12 NOTE — TELEPHONE ENCOUNTER
Routing refill request to provider for review/approval because:  Labs not current:  Creatinine > year    Guero Arriaga RN

## 2023-01-13 RX ORDER — VENLAFAXINE HYDROCHLORIDE 37.5 MG/1
CAPSULE, EXTENDED RELEASE ORAL
Qty: 180 CAPSULE | Refills: 3 | OUTPATIENT
Start: 2023-01-13

## 2023-02-01 DIAGNOSIS — F41.9 ANXIETY: ICD-10-CM

## 2023-02-01 NOTE — TELEPHONE ENCOUNTER
"Routing refill request to provider for review/approval because:  Labs out of date:  CR      Requested Prescriptions   Pending Prescriptions Disp Refills    venlafaxine (EFFEXOR XR) 150 MG 24 hr capsule [Pharmacy Med Name: VENLAFAXINE 150MG ER CAPSULE] 30 capsule 0     Sig: TAKE 1 CAPSULE BY MOUTH DAILY       Serotonin-Norepinephrine Reuptake Inhibitors  Failed - 2/1/2023  1:31 AM        Failed - Normal serum creatinine on file in past 12 months     No lab results found.    Ok to refill medication if creatinine is low          Passed - Blood pressure under 140/90 in past 12 months     BP Readings from Last 3 Encounters:   12/15/22 120/70   06/14/21 106/82   04/30/21 118/68                 Passed - Recent (12 mo) or future (30 days) visit within the authorizing provider's specialty     Patient has had an office visit with the authorizing provider or a provider within the authorizing providers department within the previous 12 mos or has a future within next 30 days. See \"Patient Info\" tab in inbasket, or \"Choose Columns\" in Meds & Orders section of the refill encounter.              Passed - Medication is active on med list        Passed - Patient is age 18 or older        Passed - No active pregnancy on record        Passed - No positive pregnancy test in past 12 months                 Katrina Quick RN 02/01/23 12:41 PM   "

## 2023-02-03 RX ORDER — VENLAFAXINE HYDROCHLORIDE 150 MG/1
CAPSULE, EXTENDED RELEASE ORAL
Qty: 30 CAPSULE | Refills: 0 | Status: SHIPPED | OUTPATIENT
Start: 2023-02-03 | End: 2023-03-09

## 2023-02-14 ENCOUNTER — MYC REFILL (OUTPATIENT)
Dept: FAMILY MEDICINE | Facility: CLINIC | Age: 53
End: 2023-02-14
Payer: COMMERCIAL

## 2023-02-14 DIAGNOSIS — G47.00 INSOMNIA, UNSPECIFIED TYPE: ICD-10-CM

## 2023-02-15 RX ORDER — ZOLPIDEM TARTRATE 5 MG/1
5 TABLET ORAL
Qty: 10 TABLET | Refills: 0 | Status: SHIPPED | OUTPATIENT
Start: 2023-02-15 | End: 2023-03-30

## 2023-03-13 ENCOUNTER — E-VISIT (OUTPATIENT)
Dept: FAMILY MEDICINE | Facility: CLINIC | Age: 53
End: 2023-03-13
Payer: COMMERCIAL

## 2023-03-13 DIAGNOSIS — S89.92XD LEFT KNEE INJURY, SUBSEQUENT ENCOUNTER: Primary | ICD-10-CM

## 2023-03-13 PROCEDURE — 99421 OL DIG E/M SVC 5-10 MIN: CPT | Performed by: FAMILY MEDICINE

## 2023-03-13 NOTE — TELEPHONE ENCOUNTER
Provider E-Visit time total (minutes):5 min    Please send pt PHQ and SARINA to complete    Steph Pedraza DO

## 2023-03-14 DIAGNOSIS — F41.9 ANXIETY: ICD-10-CM

## 2023-03-15 ENCOUNTER — TRANSFERRED RECORDS (OUTPATIENT)
Dept: HEALTH INFORMATION MANAGEMENT | Facility: CLINIC | Age: 53
End: 2023-03-15
Payer: COMMERCIAL

## 2023-03-15 RX ORDER — VENLAFAXINE HYDROCHLORIDE 150 MG/1
CAPSULE, EXTENDED RELEASE ORAL
Qty: 30 CAPSULE | Refills: 0 | OUTPATIENT
Start: 2023-03-15

## 2023-03-20 ENCOUNTER — TRANSFERRED RECORDS (OUTPATIENT)
Dept: HEALTH INFORMATION MANAGEMENT | Facility: CLINIC | Age: 53
End: 2023-03-20
Payer: COMMERCIAL

## 2023-03-23 ENCOUNTER — TRANSFERRED RECORDS (OUTPATIENT)
Dept: HEALTH INFORMATION MANAGEMENT | Facility: CLINIC | Age: 53
End: 2023-03-23
Payer: COMMERCIAL

## 2023-03-28 ENCOUNTER — MYC MEDICAL ADVICE (OUTPATIENT)
Dept: FAMILY MEDICINE | Facility: CLINIC | Age: 53
End: 2023-03-28
Payer: COMMERCIAL

## 2023-03-30 ENCOUNTER — MYC REFILL (OUTPATIENT)
Dept: FAMILY MEDICINE | Facility: CLINIC | Age: 53
End: 2023-03-30
Payer: COMMERCIAL

## 2023-03-30 DIAGNOSIS — G47.00 INSOMNIA, UNSPECIFIED TYPE: ICD-10-CM

## 2023-03-31 RX ORDER — ZOLPIDEM TARTRATE 5 MG/1
5 TABLET ORAL
Qty: 10 TABLET | Refills: 0 | Status: SHIPPED | OUTPATIENT
Start: 2023-03-31 | End: 2023-05-04

## 2023-04-03 ENCOUNTER — OFFICE VISIT (OUTPATIENT)
Dept: DERMATOLOGY | Facility: CLINIC | Age: 53
End: 2023-04-03
Attending: FAMILY MEDICINE
Payer: COMMERCIAL

## 2023-04-03 DIAGNOSIS — L81.4 LENTIGO: ICD-10-CM

## 2023-04-03 DIAGNOSIS — L82.1 SEBORRHEIC KERATOSIS: ICD-10-CM

## 2023-04-03 DIAGNOSIS — Z12.83 SCREENING FOR SKIN CANCER: ICD-10-CM

## 2023-04-03 DIAGNOSIS — B00.9 HSV INFECTION: ICD-10-CM

## 2023-04-03 DIAGNOSIS — D22.9 NEVUS: Primary | ICD-10-CM

## 2023-04-03 DIAGNOSIS — D18.01 ANGIOMA OF SKIN: ICD-10-CM

## 2023-04-03 PROCEDURE — 99204 OFFICE O/P NEW MOD 45 MIN: CPT | Performed by: PHYSICIAN ASSISTANT

## 2023-04-03 RX ORDER — VALACYCLOVIR HYDROCHLORIDE 1 G/1
TABLET, FILM COATED ORAL
Qty: 30 TABLET | Refills: 3 | Status: SHIPPED | OUTPATIENT
Start: 2023-04-03 | End: 2024-03-27

## 2023-04-03 ASSESSMENT — PAIN SCALES - GENERAL: PAINLEVEL: NO PAIN (0)

## 2023-04-03 NOTE — LETTER
4/3/2023         RE: Kayley Godoy  [address] [place] [id number]        Dear Colleague,    Thank you for referring your patient, Kayley Godoy, to the [facility]. Please see a copy of my visit note below.    HPI:   Chief complaints: Kayley Godoy is a pleasant 52 year old female who presents for Full skin cancer screening to rule out skin cancer   Last Skin Exam: n/a      1st Baseline: yes  Personal HX of Skin Cancer: no   Personal HX of Malignant Melanoma: no   Family HX of Skin Cancer / Malignant Melanoma: no  Personal HX of Atypical Moles:   no  Risk factors: history of sun exposure and burns  New / Changing lesions: none  Social History: is a   On review of systems, there are no further skin complaints, patient is feeling otherwise well.   ROS of the following were done and are negative: Constitutional, Eyes, Ears, Nose,   Mouth, Throat, Cardiovascular, Respiratory, GI, Genitourinary, Musculoskeletal,   Psychiatric, Endocrine, Allergic/Immunologic.    PHYSICAL EXAM:   There were no vitals taken for this visit.  Skin exam performed as follows: Type 2 skin. Mood appropriate  Alert and Oriented X 3. Well developed, well nourished in no distress.  General appearance: Normal  Head including face: Normal  Eyes: conjunctiva and lids: Normal  Mouth: Lips, teeth, gums: Normal  Neck: Normal  Chest-breast/axillae: Normal  Back: Normal  Extremities: digits/nails (clubbing): Normal  Eccrine and Apocrine glands: Normal  Right upper extremity: Normal  Left upper extremity: Normal  Right lower extremity: Normal  Left lower extremity: Normal  Skin: Scalp and body hair: See below    Pt deferred exam of breasts, groin, buttocks: No    Other physical findings:  1. Multiple pigmented macules on extremities and trunk  2. Multiple pigmented macules on face, trunk and extremities  3. Multiple vascular papules on trunk, arms and legs  4. Multiple scattered keratotic plaques  5.  PIH on the left buttocks       Except as noted above, no other signs of skin cancer or melanoma.     ASSESSMENT/PLAN:   Benign Full skin cancer screening today. . Patient with history of none  Advised on monthly self exams and 1 year  Patient Education: Appropriate brochures given.    1. Multiple benign appearing melanocytic nevi on arms, legs and trunk. Discussed ABCDEs of melanoma and sunscreen.   2. Multiple lentigos on arms, legs and trunk. Advised benign, no treatment needed.  3. Multiple scattered angiomas. Advised benign, no treatment needed.   4. Seborrheic keratosis on arms, legs and trunk. Advised benign, no treatment needed.  5. 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 YES  2.) Patient received a complete physical skin examination: YES  3.) Patient was counseled to perform a monthly self skin examination: YES  Scribed By: Cherelle Bell, MS, PA-C          Again, thank you for allowing me to participate in the care of your patient.        Sincerely,        Cherelle Bell PA-C

## 2023-04-03 NOTE — PROGRESS NOTES
HPI:   Chief complaints: Kayley Godoy is a pleasant 52 year old female who presents for Full skin cancer screening to rule out skin cancer   Last Skin Exam: n/a      1st Baseline: yes  Personal HX of Skin Cancer: no   Personal HX of Malignant Melanoma: no   Family HX of Skin Cancer / Malignant Melanoma: no  Personal HX of Atypical Moles:   no  Risk factors: history of sun exposure and burns  New / Changing lesions: none  Social History: is a   On review of systems, there are no further skin complaints, patient is feeling otherwise well.   ROS of the following were done and are negative: Constitutional, Eyes, Ears, Nose,   Mouth, Throat, Cardiovascular, Respiratory, GI, Genitourinary, Musculoskeletal,   Psychiatric, Endocrine, Allergic/Immunologic.    PHYSICAL EXAM:   There were no vitals taken for this visit.  Skin exam performed as follows: Type 2 skin. Mood appropriate  Alert and Oriented X 3. Well developed, well nourished in no distress.  General appearance: Normal  Head including face: Normal  Eyes: conjunctiva and lids: Normal  Mouth: Lips, teeth, gums: Normal  Neck: Normal  Chest-breast/axillae: Normal  Back: Normal  Extremities: digits/nails (clubbing): Normal  Eccrine and Apocrine glands: Normal  Right upper extremity: Normal  Left upper extremity: Normal  Right lower extremity: Normal  Left lower extremity: Normal  Skin: Scalp and body hair: See below    Pt deferred exam of breasts, groin, buttocks: No    Other physical findings:  1. Multiple pigmented macules on extremities and trunk  2. Multiple pigmented macules on face, trunk and extremities  3. Multiple vascular papules on trunk, arms and legs  4. Multiple scattered keratotic plaques  5. PIH on the left buttocks       Except as noted above, no other signs of skin cancer or melanoma.     ASSESSMENT/PLAN:   Benign Full skin cancer screening today. . Patient with history of none  Advised on monthly self exams and 1 year  Patient  Education: Appropriate brochures given.    1. Multiple benign appearing melanocytic nevi on arms, legs and trunk. Discussed ABCDEs of melanoma and sunscreen.   2. Multiple lentigos on arms, legs and trunk. Advised benign, no treatment needed.  3. Multiple scattered angiomas. Advised benign, no treatment needed.   4. Seborrheic keratosis on arms, legs and trunk. Advised benign, no treatment needed.  5. 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 YES  2.) Patient received a complete physical skin examination: YES  3.) Patient was counseled to perform a monthly self skin examination: YES  Scribed By: Cherelle Bell MS, PA-C

## 2023-04-09 ENCOUNTER — MYC MEDICAL ADVICE (OUTPATIENT)
Dept: FAMILY MEDICINE | Facility: CLINIC | Age: 53
End: 2023-04-09
Payer: COMMERCIAL

## 2023-04-10 ASSESSMENT — ANXIETY QUESTIONNAIRES
6. BECOMING EASILY ANNOYED OR IRRITABLE: NOT AT ALL
3. WORRYING TOO MUCH ABOUT DIFFERENT THINGS: SEVERAL DAYS
2. NOT BEING ABLE TO STOP OR CONTROL WORRYING: NOT AT ALL
1. FEELING NERVOUS, ANXIOUS, OR ON EDGE: NOT AT ALL
7. FEELING AFRAID AS IF SOMETHING AWFUL MIGHT HAPPEN: NOT AT ALL
4. TROUBLE RELAXING: SEVERAL DAYS
IF YOU CHECKED OFF ANY PROBLEMS ON THIS QUESTIONNAIRE, HOW DIFFICULT HAVE THESE PROBLEMS MADE IT FOR YOU TO DO YOUR WORK, TAKE CARE OF THINGS AT HOME, OR GET ALONG WITH OTHER PEOPLE: NOT DIFFICULT AT ALL
5. BEING SO RESTLESS THAT IT IS HARD TO SIT STILL: NOT AT ALL
GAD7 TOTAL SCORE: 2
GAD7 TOTAL SCORE: 2
7. FEELING AFRAID AS IF SOMETHING AWFUL MIGHT HAPPEN: NOT AT ALL
8. IF YOU CHECKED OFF ANY PROBLEMS, HOW DIFFICULT HAVE THESE MADE IT FOR YOU TO DO YOUR WORK, TAKE CARE OF THINGS AT HOME, OR GET ALONG WITH OTHER PEOPLE?: NOT DIFFICULT AT ALL
GAD7 TOTAL SCORE: 2

## 2023-04-10 ASSESSMENT — PATIENT HEALTH QUESTIONNAIRE - PHQ9
SUM OF ALL RESPONSES TO PHQ QUESTIONS 1-9: 6
SUM OF ALL RESPONSES TO PHQ QUESTIONS 1-9: 6
10. IF YOU CHECKED OFF ANY PROBLEMS, HOW DIFFICULT HAVE THESE PROBLEMS MADE IT FOR YOU TO DO YOUR WORK, TAKE CARE OF THINGS AT HOME, OR GET ALONG WITH OTHER PEOPLE: SOMEWHAT DIFFICULT

## 2023-04-11 ENCOUNTER — VIRTUAL VISIT (OUTPATIENT)
Dept: FAMILY MEDICINE | Facility: CLINIC | Age: 53
End: 2023-04-11
Payer: COMMERCIAL

## 2023-04-11 DIAGNOSIS — F41.1 GAD (GENERALIZED ANXIETY DISORDER): Primary | ICD-10-CM

## 2023-04-11 PROCEDURE — 99213 OFFICE O/P EST LOW 20 MIN: CPT | Mod: VID | Performed by: FAMILY MEDICINE

## 2023-04-11 RX ORDER — FLUOXETINE 10 MG/1
10 CAPSULE ORAL DAILY
Qty: 60 CAPSULE | Refills: 1 | Status: SHIPPED | OUTPATIENT
Start: 2023-04-11 | End: 2023-06-01

## 2023-04-11 ASSESSMENT — PATIENT HEALTH QUESTIONNAIRE - PHQ9
SUM OF ALL RESPONSES TO PHQ QUESTIONS 1-9: 6
10. IF YOU CHECKED OFF ANY PROBLEMS, HOW DIFFICULT HAVE THESE PROBLEMS MADE IT FOR YOU TO DO YOUR WORK, TAKE CARE OF THINGS AT HOME, OR GET ALONG WITH OTHER PEOPLE: SOMEWHAT DIFFICULT

## 2023-04-11 ASSESSMENT — ANXIETY QUESTIONNAIRES: GAD7 TOTAL SCORE: 2

## 2023-04-11 NOTE — PROGRESS NOTES
"Kayley is a 52 year old who is being evaluated via a billable video visit.      How would you like to obtain your AVS? MyChart  If the video visit is dropped, the invitation should be resent by: Text to cell phone: [phone]  Will anyone else be joining your video visit? No          A/P:      ICD-10-CM    1. SARINA (generalized anxiety disorder)  F41.1 FLUoxetine (PROZAC) 10 MG capsule        Ongoing anxiety.  Improved on increased dose of venlafaxine but side effects noted.  Plan alternate therapy with fluoxetine.  Side effects reviewed.  Encouraged therapy as well.  F/u 4-6 weeks.        Subjective   Kayley is a 52 year old, presenting for the following health issues:  No chief complaint on file.         View : No data to display.              History of Present Illness       Mental Health Follow-up:  Patient presents to follow-up on Anxiety.    Patient's anxiety since last visit has been:  Better  The patient is not having other symptoms associated with anxiety.  Any significant life events: No  Patient is not feeling anxious or having panic attacks.  Patient has no concerns about alcohol or drug use.    She eats 2-3 servings of fruits and vegetables daily.She consumes 2 sweetened beverage(s) daily.She exercises with enough effort to increase her heart rate 9 or less minutes per day.  She exercises with enough effort to increase her heart rate 3 or less days per week.   She is taking medications regularly.    Today's PHQ-9         PHQ-9 Total Score: 6    PHQ-9 Q9 Thoughts of better off dead/self-harm past 2 weeks :   Not at all    How difficult have these problems made it for you to do your work, take care of things at home, or get along with other people: Somewhat difficult  Today's SARINA-7 Score: 2       Feels like med has worked well for her anxiety but has more of a \"flatness\" that she is concerned might be more of a side effects.  Really noticed this around Easter  Not as enjoyable, did not get excited or do the fun " things she normally would.    Feels like the panic attacks and increased irritability have definitely improved    Continues to have some specific relationship anxieties that she knows are not rational.      Review of Systems         Objective           Vitals:  No vitals were obtained today due to virtual visit.    Physical Exam   GENERAL: Healthy, alert and no distress  EYES: Eyes grossly normal to inspection.  No discharge or erythema, or obvious scleral/conjunctival abnormalities.  RESP: No audible wheeze, cough, or visible cyanosis.  No visible retractions or increased work of breathing.    SKIN: Visible skin clear. No significant rash, abnormal pigmentation or lesions.  NEURO: Cranial nerves grossly intact.  Mentation and speech appropriate for age.  PSYCH: Mentation appears normal, affect normal/bright, judgement and insight intact, normal speech and appearance well-groomed.    Epic reviewed            Video-Visit Details    Type of service:  Video Visit     Originating Location (pt. Location): Other work    Distant Location (provider location):  On-site  Platform used for Video Visit: [employer]

## 2023-04-13 ENCOUNTER — TRANSFERRED RECORDS (OUTPATIENT)
Dept: HEALTH INFORMATION MANAGEMENT | Facility: CLINIC | Age: 53
End: 2023-04-13
Payer: COMMERCIAL

## 2023-05-04 ENCOUNTER — MYC REFILL (OUTPATIENT)
Dept: FAMILY MEDICINE | Facility: CLINIC | Age: 53
End: 2023-05-04
Payer: COMMERCIAL

## 2023-05-04 DIAGNOSIS — G47.00 INSOMNIA, UNSPECIFIED TYPE: ICD-10-CM

## 2023-05-05 RX ORDER — ZOLPIDEM TARTRATE 5 MG/1
5 TABLET ORAL
Qty: 10 TABLET | Refills: 0 | Status: SHIPPED | OUTPATIENT
Start: 2023-05-05 | End: 2023-05-29

## 2023-05-12 ENCOUNTER — HOSPITAL ENCOUNTER (EMERGENCY)
Facility: CLINIC | Age: 53
Discharge: HOME | End: 2023-05-12
Attending: EMERGENCY MEDICINE | Admitting: EMERGENCY MEDICINE
Payer: COMMERCIAL

## 2023-05-12 VITALS
DIASTOLIC BLOOD PRESSURE: 84 MMHG | BODY MASS INDEX: 28.93 KG/M2 | TEMPERATURE: 98.3 F | HEART RATE: 75 BPM | OXYGEN SATURATION: 99 % | HEIGHT: 66 IN | RESPIRATION RATE: 20 BRPM | SYSTOLIC BLOOD PRESSURE: 116 MMHG | WEIGHT: 180 LBS

## 2023-05-12 DIAGNOSIS — T78.40XA ALLERGIC REACTION, INITIAL ENCOUNTER: ICD-10-CM

## 2023-05-12 PROCEDURE — 250N000011 HC RX IP 250 OP 636: Performed by: EMERGENCY MEDICINE

## 2023-05-12 PROCEDURE — 99284 EMERGENCY DEPT VISIT MOD MDM: CPT | Mod: 25 | Performed by: EMERGENCY MEDICINE

## 2023-05-12 PROCEDURE — 83520 IMMUNOASSAY QUANT NOS NONAB: CPT | Performed by: EMERGENCY MEDICINE

## 2023-05-12 PROCEDURE — 96374 THER/PROPH/DIAG INJ IV PUSH: CPT | Performed by: EMERGENCY MEDICINE

## 2023-05-12 PROCEDURE — 99284 EMERGENCY DEPT VISIT MOD MDM: CPT | Performed by: EMERGENCY MEDICINE

## 2023-05-12 PROCEDURE — 36415 COLL VENOUS BLD VENIPUNCTURE: CPT | Performed by: EMERGENCY MEDICINE

## 2023-05-12 PROCEDURE — 250N000013 HC RX MED GY IP 250 OP 250 PS 637: Performed by: EMERGENCY MEDICINE

## 2023-05-12 RX ORDER — CETIRIZINE HYDROCHLORIDE 10 MG/1
10 TABLET ORAL ONCE
Status: COMPLETED | OUTPATIENT
Start: 2023-05-12 | End: 2023-05-12

## 2023-05-12 RX ORDER — PREDNISONE 20 MG/1
TABLET ORAL
Qty: 5 TABLET | Refills: 0 | Status: SHIPPED | OUTPATIENT
Start: 2023-05-12 | End: 2023-06-29

## 2023-05-12 RX ORDER — METHYLPREDNISOLONE SODIUM SUCCINATE 125 MG/2ML
125 INJECTION, POWDER, LYOPHILIZED, FOR SOLUTION INTRAMUSCULAR; INTRAVENOUS ONCE
Status: COMPLETED | OUTPATIENT
Start: 2023-05-12 | End: 2023-05-12

## 2023-05-12 RX ORDER — EPINEPHRINE 0.3 MG/.3ML
0.3 INJECTION SUBCUTANEOUS PRN
Qty: 2 EACH | Refills: 0 | Status: SHIPPED | OUTPATIENT
Start: 2023-05-12

## 2023-05-12 RX ORDER — PREDNISONE 20 MG/1
TABLET ORAL
Qty: 5 TABLET | Refills: 0 | Status: SHIPPED | OUTPATIENT
Start: 2023-05-12 | End: 2023-05-12

## 2023-05-12 RX ADMIN — CETIRIZINE HYDROCHLORIDE 10 MG: 10 TABLET, FILM COATED ORAL at 09:37

## 2023-05-12 RX ADMIN — METHYLPREDNISOLONE SODIUM SUCCINATE 125 MG: 125 INJECTION INTRAMUSCULAR; INTRAVENOUS at 09:43

## 2023-05-12 ASSESSMENT — ENCOUNTER SYMPTOMS
GASTROINTESTINAL NEGATIVE: 1
ALLERGIC/IMMUNOLOGIC NEGATIVE: 1
CONSTITUTIONAL NEGATIVE: 1
MUSCULOSKELETAL NEGATIVE: 1
ENDOCRINE NEGATIVE: 1
EYES NEGATIVE: 1
NEUROLOGICAL NEGATIVE: 1
RESPIRATORY NEGATIVE: 1
CARDIOVASCULAR NEGATIVE: 1
HEMATOLOGIC/LYMPHATIC NEGATIVE: 1
PSYCHIATRIC NEGATIVE: 1

## 2023-05-12 ASSESSMENT — ACTIVITIES OF DAILY LIVING (ADL)
ADLS_ACUITY_SCORE: 35
ADLS_ACUITY_SCORE: 35

## 2023-05-12 NOTE — DISCHARGE INSTRUCTIONS
1) Your evaluation today revealed symptoms suspicious for an allergic reaction.  After period of care your symptoms did not meet to you for more serious reaction such as anaphylaxis.  You received steroids during your visit and I discharged home with a steroid taper with a referral to allergy clinic placed to help with follow-up testing if necessary.  You are also given an EpiPen to use in the event that your symptoms worsen such as difficulty swallowing or voice change, shortness of breath, fainting or any new concerns.    2) We have discussed that your allergic reaction is likely related to the comforter you purchased from [employer] 2 days ago as this was the only new item that preceded your symptoms prior to arrival.    3) You appear stable for discharge to home at this time however if your symptoms worsen or you develop new symptoms you should return to be reevaluated.  Consider using Zyrtec 1 to 2 tablets daily over the next 5 days if needed to help with itching and or swelling.

## 2023-05-12 NOTE — ED PROVIDER NOTES
History     Chief Complaint   Patient presents with     Allergic Reaction     Hives     HPI  Kayley Godoy is a 52 year old female who presents for evaluation with concern about an allergic reaction.    Patient's prescribed medications were reviewed and her prior medical diagnoses.    On examination patient reports she developed a sense of itching now with puffiness around her eyes itching behind her ears that seems to generalized to her extremities this morning.  She works as a  and initially noted symptoms last night before going to bed.  She took Benadryl which seemed to help her itching and she was able to sleep through the night.  She reports no new environmental exposures although she purchased a comforter from [employer] for 8 hours prior.  Her  also use the comfort and has no symptoms and he actually has seasonal allergies.  Only new prescriptions fluoxetine the last month.  She has had no shortness of breath or vomiting or abdominal pain.  She had a piece of toast prior to arrival.  She felt like she was developing a scratchiness in her throat but thinks that this is subjective.  Due to generalization of symptoms she was concerned about an allergic reaction although the exact allergen or trigger is not known.    Allergies:  Allergies   Allergen Reactions     Wellbutrin [Bupropion Hydrobromide] Itching       Problem List:    Patient Active Problem List    Diagnosis Date Noted     Tubular adenoma of colon 08/05/2019     Priority: Medium     Dyspepsia 03/23/2016     Priority: Medium     Dyspepsia and other specified disorders of function of stomach 09/02/2014     Priority: Medium     Anxiety 03/01/2012     Priority: Medium     24 hour contact handout given 02/13/2012     Priority: Medium     EMERGENCY CARE PLAN  Presenting Problem Signs and Symptoms Treatment Plan    Questions or conerns during clinic hours    I will call the clinic directly     Questions or conerns outside clinic  "hours    I will call the 24 hour nurse line at [phone]    Patient needs to schedule an appointment    I will call the 24 hour scheduling team at [phone] or clinic directly    Same day treatment     I will call the clinic first, nurse line if after hours, urgent care and express care if needed                                 Dysthymia 02/13/2012     Priority: Medium     ANAND 3 05/19/2011     Priority: Medium     Distant h/o abnormal pap, around age 18.  Had treatment with \"laser\".  Believes it to have been a CIN3 lesion.  Had repeat biopsies and all normal follow up  4/29/16: Pap - ASCUS, Neg HPV. Plan cotest in 3 years.        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 Biopsied by Dr. Mesa @ [facility].  4/4/05 Dr. Degroot @ [facility]: returning for removal of remnants of lesion. Prev path showed atypical melanocytic nevus.   4/6/05 Path shows no residual atypical melanocyte proliferation.        BILATERAL KNEE PAIN 02/04/2005     Priority: Medium     1/13/05 Dr. Akbar @ [facility]: Duration 4m. Plan MRI.  2/4/05 Dr. Mesa: Daily pain, R>L, feels hot to touch. Prev MRI shows some early meniscal changes in both knees, medical meniscus posterior horn. Advised glucosamine/ chondroitin.       Decreased libido 02/04/2005     Priority: Medium     2/4/05 Dr. Mesa: advised try to incr frequency of intimacies to incr receptiveness.          Past Medical History:    Past Medical History:   Diagnosis Date     Abnormal Pap smear of cervix \"In her 20's\"     ASCUS favor benign 04/29/2016     Tubular adenoma of colon        Past Surgical History:    Past Surgical History:   Procedure Laterality Date     CL AFF SURGICAL PATHOLOGY  1993     COLONOSCOPY N/A 7/31/2019    Procedure: " "Colonoscopy, With Polypectomy And Biopsy;  Surgeon: Dagoberto Ho DO;  Location: [facility]     SURGICAL HISTORY OF -   2005    mole removal left upper chest     SURGICAL HISTORY OF -   2008    endometrial ablation     ZZC APPENDECTOMY  age 13       Family History:    Family History   Problem Relation Age of Onset     Cerebrovascular Disease Mother      Diabetes Father      C.A.D. Father         stents at age 70     Hypertension No family hx of      Breast Cancer No family hx of      Cancer - colorectal No family hx of        Social History:  Marital Status:   [2]  Social History     Tobacco Use     Smoking status: Former     Packs/day: 0.50     Years: 15.00     Pack years: 7.50     Types: Cigarettes     Smokeless tobacco: Never   Substance Use Topics     Alcohol use: Yes     Comment: rarely     Drug use: No        Medications:    EPINEPHrine (ANY BX GENERIC EQUIV) 0.3 MG/0.3ML injection 2-pack  predniSONE (DELTASONE) 20 MG tablet  FLUoxetine (PROZAC) 10 MG capsule  IBUPROFEN 200 MG OR TABS  MAGNESIUM PO  valACYclovir (VALTREX) 1000 mg tablet  zolpidem (AMBIEN) 5 MG tablet          Review of Systems   Constitutional: Negative.    HENT: Negative.    Eyes: Negative.    Respiratory: Negative.    Cardiovascular: Negative.    Gastrointestinal: Negative.    Endocrine: Negative.    Genitourinary: Negative.    Musculoskeletal: Negative.    Skin: Positive for rash.   Allergic/Immunologic: Negative.    Neurological: Negative.    Hematological: Negative.    Psychiatric/Behavioral: Negative.    All other systems reviewed and are negative.      Physical Exam   BP: (!) 120/90  Pulse: 89  Temp: 98.3  F (36.8  C)  Resp: 18  Height: 167.6 cm (5' 6\")  Weight: 81.6 kg (180 lb)  SpO2: 99 %      Physical Exam  Constitutional:       General: She is not in acute distress.     Appearance: Normal appearance. She is not ill-appearing, toxic-appearing or diaphoretic.   HENT:      Head: Normocephalic and atraumatic.        Right " Ear: Tympanic membrane normal.      Left Ear: Tympanic membrane normal.      Nose: Nose normal.      Mouth/Throat:      Mouth: Mucous membranes are moist.   Eyes:      Extraocular Movements: Extraocular movements intact.      Pupils: Pupils are equal, round, and reactive to light.   Cardiovascular:      Rate and Rhythm: Normal rate and regular rhythm.      Pulses: Normal pulses.      Heart sounds: Normal heart sounds.   Pulmonary:      Effort: Pulmonary effort is normal. No respiratory distress.      Breath sounds: Normal breath sounds. No stridor. No wheezing, rhonchi or rales.   Chest:      Chest wall: No tenderness.   Musculoskeletal:         General: No swelling, tenderness, deformity or signs of injury.      Cervical back: Normal range of motion and neck supple.      Right lower leg: No edema.      Left lower leg: No edema.   Skin:     Capillary Refill: Capillary refill takes less than 2 seconds.      Coloration: Skin is not jaundiced.      Findings: Rash present. No bruising. Rash is urticarial.          Neurological:      General: No focal deficit present.      Mental Status: She is alert and oriented to person, place, and time.      Cranial Nerves: No cranial nerve deficit.      Sensory: No sensory deficit.      Motor: No weakness.      Coordination: Coordination normal.      Gait: Gait normal.      Deep Tendon Reflexes: Reflexes normal.   Psychiatric:         Mood and Affect: Mood normal.         Behavior: Behavior normal.         Thought Content: Thought content normal.         Judgment: Judgment normal.         ED Course                 Procedures              Critical Care time:  none               ED medications:  Medications   EPINEPHrine (ADRENALIN) kit 0.3 mg (has no administration in time range)   cetirizine (zyrTEC) tablet 10 mg (10 mg Oral $Given 5/12/23 0969)   methylPREDNISolone sodium succinate (solu-MEDROL) injection 125 mg (125 mg Intravenous $Given 5/12/23 0943)       ED vitals:  Vitals:     "05/12/23 0846   BP: (!) 120/90   Pulse: 89   Resp: 18   Temp: 98.3  F (36.8  C)   TempSrc: Oral   SpO2: 99%   Weight: 81.6 kg (180 lb)   Height: 1.676 m (5' 6\")       ED labs and imaging: none    Assessments & Plan (with Medical Decision Making)   Assessment Summary and Clinical Impression: 52-year-old female presented with concern about an allergic reaction. she arrived with urticarial lesions overlying the upper extremity and abdomen.  She had puffiness around her eyelids and redness around her ears.  She reported her rash was pruritic.  The exact trigger allergen is not known although it appears that the likely culprit is a comforter she purchased from [employer] 48 hours prior.  She reported symptoms began the night prior to arrival.  She works as a .  No new medications in the last month.  Currently on fluoxetine.  She had a normal cardiac and lung exam airway was patent without stridor or hoarseness no submental fullness and no lingual edema.  She was treated for's possible allergic reaction and monitor for possible of anaphylactoid reaction and/or anaphylaxis  After period of care her urticaria and itchy rash about the face and arms resolved almost completely.  She was discharged home with an allergy referral if needed and in the future, and prednisone taper over the weekend and EpiPen as needed if symptoms recur or worsen.    ED course and Plan:  Reviewed the medical record.  We discussed care goals and treatment options given current symptoms prearrival.  She had taken Benadryl the night before and took a dose before ED arrival reported some improvement in her sense of itching.  She was offered Zyrtec and IV Solu-Medrol.  Epinephrine was made available.  Patient did not require EpiPen during her ED course.  After period of observation and care the area of erythema about the face, and extremities resolved near completely.  She was discharged home with a prednisone taper, allergy referral " placed as needed for follow-up in the next 6 weeks to 3 months discussed with patient.  we also reviewed EpiPen use at home.  She was given an EpiPen in the event her symptoms evolve or there are new concerns.  Reviewed concerning symptoms including reasons to return for evaluation patient expressed comfort, understanding and agreement with plan of care.      Disclaimer: This note consists of symbols derived from keyboarding, dictation and/or voice recognition software. As a result, there may be errors in the script that have gone undetected. Please consider this when interpreting information found in this chart.  I have reviewed the nursing notes.    I have reviewed the findings, diagnosis, plan and need for follow up with the patient.           Medical Decision Making  The patient's presentation was of moderate complexity (an acute illness with systemic symptoms).    The patient's evaluation involved:  ordering and/or review of 1 test(s) in this encounter (Diagnostic labs)    The patient's management necessitated moderate risk (prescription drug management including medications given in the ED).        New Prescriptions    EPINEPHRINE (ANY BX GENERIC EQUIV) 0.3 MG/0.3ML INJECTION 2-PACK    Inject 0.3 mLs (0.3 mg) into the muscle as needed for anaphylaxis May repeat one time in 5-15 minutes if response to initial dose is inadequate.    PREDNISONE (DELTASONE) 20 MG TABLET    1 tab daily for 3 days, then 1/2 tab daily for 4 days       Final diagnoses:   Allergic reaction, initial encounter       5/12/2023   [facility] EMERGENCY DEPT     Moo Reed MD  05/12/23 7903

## 2023-05-12 NOTE — ED TRIAGE NOTES
"Pt presents to ED with concerns of allergic reaction-unsure to watch she may be allergic to. Pt states swollen/itchy eyes, scattered hives/redness/swelling. Denies airway concerns. States swallowing may not be as \"smooth\". No drooling. Pt took 50 mg benadryl last night and 25 mg benadryl this morning.      Triage Assessment     Row Name 05/12/23 0847       Triage Assessment (Adult)    Airway WDL WDL       Respiratory WDL    Respiratory WDL WDL       Skin Circulation/Temperature WDL    Skin Circulation/Temperature WDL WDL       Cardiac WDL    Cardiac WDL WDL       Peripheral/Neurovascular WDL    Peripheral Neurovascular WDL WDL       Cognitive/Neuro/Behavioral WDL    Cognitive/Neuro/Behavioral WDL WDL              "

## 2023-05-15 LAB — TRYPTASE SERPL-MCNC: 7.8 UG/L

## 2023-05-29 ENCOUNTER — MYC REFILL (OUTPATIENT)
Dept: FAMILY MEDICINE | Facility: CLINIC | Age: 53
End: 2023-05-29
Payer: COMMERCIAL

## 2023-05-29 DIAGNOSIS — G47.00 INSOMNIA, UNSPECIFIED TYPE: ICD-10-CM

## 2023-05-30 DIAGNOSIS — F41.1 GAD (GENERALIZED ANXIETY DISORDER): ICD-10-CM

## 2023-05-30 NOTE — TELEPHONE ENCOUNTER
Routing refill request to provider for review/approval because:  Drug not on the [facility] refill protocol                 Requested Prescriptions   Pending Prescriptions Disp Refills     zolpidem (AMBIEN) 5 MG tablet 10 tablet 0     Sig: Take 1 tablet (5 mg) by mouth nightly as needed for sleep       There is no refill protocol information for this order              Keyla Valencia RN 05/30/23 5:20 PM

## 2023-05-31 NOTE — TELEPHONE ENCOUNTER
"Routing refill request to provider for review/approval because:  First refill. Call placed to Patient. States that things are going well on medication, not feeling so \"hum drum.\"  Patient has appointment coming up on 6/29/23.  Gareth Herrera RN       "

## 2023-06-01 ENCOUNTER — HEALTH MAINTENANCE LETTER (OUTPATIENT)
Age: 53
End: 2023-06-01

## 2023-06-01 RX ORDER — FLUOXETINE 10 MG/1
CAPSULE ORAL
Qty: 60 CAPSULE | Refills: 1 | Status: SHIPPED | OUTPATIENT
Start: 2023-06-01 | End: 2023-06-29

## 2023-06-01 RX ORDER — ZOLPIDEM TARTRATE 5 MG/1
5 TABLET ORAL
Qty: 10 TABLET | Refills: 0 | Status: SHIPPED | OUTPATIENT
Start: 2023-06-01 | End: 2023-06-14

## 2023-06-14 ENCOUNTER — MYC MEDICAL ADVICE (OUTPATIENT)
Dept: FAMILY MEDICINE | Facility: CLINIC | Age: 53
End: 2023-06-14
Payer: COMMERCIAL

## 2023-06-14 DIAGNOSIS — G47.00 INSOMNIA, UNSPECIFIED TYPE: Primary | ICD-10-CM

## 2023-06-14 RX ORDER — ZOLPIDEM TARTRATE 5 MG/1
5 TABLET ORAL
Qty: 10 TABLET | Refills: 0 | Status: SHIPPED | OUTPATIENT
Start: 2023-06-14 | End: 2023-07-27

## 2023-06-29 ENCOUNTER — OFFICE VISIT (OUTPATIENT)
Dept: FAMILY MEDICINE | Facility: CLINIC | Age: 53
End: 2023-06-29
Payer: COMMERCIAL

## 2023-06-29 VITALS
WEIGHT: 186 LBS | SYSTOLIC BLOOD PRESSURE: 120 MMHG | TEMPERATURE: 98 F | HEIGHT: 65 IN | OXYGEN SATURATION: 100 % | HEART RATE: 60 BPM | BODY MASS INDEX: 30.99 KG/M2 | DIASTOLIC BLOOD PRESSURE: 64 MMHG | RESPIRATION RATE: 12 BRPM

## 2023-06-29 DIAGNOSIS — F41.1 GAD (GENERALIZED ANXIETY DISORDER): ICD-10-CM

## 2023-06-29 DIAGNOSIS — S83.512D RUPTURE OF ANTERIOR CRUCIATE LIGAMENT OF LEFT KNEE, SUBSEQUENT ENCOUNTER: ICD-10-CM

## 2023-06-29 DIAGNOSIS — Z01.818 PREOP GENERAL PHYSICAL EXAM: Primary | ICD-10-CM

## 2023-06-29 LAB
ERYTHROCYTE [DISTWIDTH] IN BLOOD BY AUTOMATED COUNT: 13.5 % (ref 10–15)
HCT VFR BLD AUTO: 37.2 % (ref 35–47)
HGB BLD-MCNC: 12.5 G/DL (ref 11.7–15.7)
MCH RBC QN AUTO: 31.3 PG (ref 26.5–33)
MCHC RBC AUTO-ENTMCNC: 33.6 G/DL (ref 31.5–36.5)
MCV RBC AUTO: 93 FL (ref 78–100)
PLATELET # BLD AUTO: 215 10E3/UL (ref 150–450)
RBC # BLD AUTO: 4 10E6/UL (ref 3.8–5.2)
WBC # BLD AUTO: 8.1 10E3/UL (ref 4–11)

## 2023-06-29 PROCEDURE — 99214 OFFICE O/P EST MOD 30 MIN: CPT | Performed by: FAMILY MEDICINE

## 2023-06-29 PROCEDURE — 36415 COLL VENOUS BLD VENIPUNCTURE: CPT | Performed by: FAMILY MEDICINE

## 2023-06-29 PROCEDURE — 85027 COMPLETE CBC AUTOMATED: CPT | Performed by: FAMILY MEDICINE

## 2023-06-29 ASSESSMENT — PATIENT HEALTH QUESTIONNAIRE - PHQ9
SUM OF ALL RESPONSES TO PHQ QUESTIONS 1-9: 3
SUM OF ALL RESPONSES TO PHQ QUESTIONS 1-9: 3
10. IF YOU CHECKED OFF ANY PROBLEMS, HOW DIFFICULT HAVE THESE PROBLEMS MADE IT FOR YOU TO DO YOUR WORK, TAKE CARE OF THINGS AT HOME, OR GET ALONG WITH OTHER PEOPLE: NOT DIFFICULT AT ALL

## 2023-06-29 NOTE — PATIENT INSTRUCTIONS
For informational purposes only. Not to replace the advice of your health care provider. Copyright   2003,  [employer] [facility]. All rights reserved. Clinically reviewed by Antionette Field MD. [employer] [id number] - REV .  Preparing for Your Surgery  Getting started  A nurse will call you to review your health history and instructions. They will give you an arrival time based on your scheduled surgery time. Please be ready to share:  Your doctor's clinic name and phone number  Your medical, surgical, and anesthesia history  A list of allergies and sensitivities  A list of medicines, including herbal treatments and over-the-counter drugs  Whether the patient has a legal guardian (ask how to send us the papers in advance)  Please tell us if you're pregnant--or if there's any chance you might be pregnant. Some surgeries may injure a fetus (unborn baby), so they require a pregnancy test. Surgeries that are safe for a fetus don't always need a test, and you can choose whether to have one.   If you have a child who's having surgery, please ask for a copy of Preparing for Your Child's Surgery.    Preparing for surgery  Within 10 to 30 days of surgery: Have a pre-op exam (sometimes called an H&P, or History and Physical). This can be done at a clinic or pre-operative center.  If you're having a , you may not need this exam. Talk to your care team.  At your pre-op exam, talk to your care team about all medicines you take. If you need to stop any medicines before surgery, ask when to start taking them again.  We do this for your safety. Many medicines can make you bleed too much during surgery. Some change how well surgery (anesthesia) drugs work.  Call your insurance company to let them know you're having surgery. (If you don't have insurance, call [phone].)  Call your clinic if there's any change in your health. This includes signs of a cold or flu (sore throat, runny nose, cough, rash, fever). It  also includes a scrape or scratch near the surgery site.  If you have questions on the day of surgery, call your hospital or surgery center.  Eating and drinking guidelines  For your safety: Unless your surgeon tells you otherwise, follow the guidelines below.  Eat and drink as usual until 8 hours before you arrive for surgery. After that, no food or milk.  Drink clear liquids until 2 hours before you arrive. These are liquids you can see through, like water, Gatorade, and Propel Water. They also include plain black coffee and tea (no cream or milk), candy, and breath mints. You can spit out gum when you arrive.  If you drink alcohol: Stop drinking it the night before surgery.  If your care team tells you to take medicine on the morning of surgery, it's okay to take it with a sip of water.  Preventing infection  Shower or bathe the night before and morning of your surgery. Follow the instructions your clinic gave you. (If no instructions, use regular soap.)  Don't shave or clip hair near your surgery site. We'll remove the hair if needed.  Don't smoke or vape the morning of surgery. You may chew nicotine gum up to 2 hours before surgery. A nicotine patch is okay.  Note: Some surgeries require you to completely quit smoking and nicotine. Check with your surgeon.  Your care team will make every effort to keep you safe from infection. We will:  Clean our hands often with soap and water (or an alcohol-based hand rub).  Clean the skin at your surgery site with a special soap that kills germs.  Give you a special gown to keep you warm. (Cold raises the risk of infection.)  Wear special hair covers, masks, gowns and gloves during surgery.  Give antibiotic medicine, if prescribed. Not all surgeries need antibiotics.  What to bring on the day of surgery  Photo ID and insurance card  Copy of your health care directive, if you have one  Glasses and hearing aids (bring cases)  You can't wear contacts during surgery  Inhaler and  eye drops, if you use them (tell us about these when you arrive)  CPAP machine or breathing device, if you use them  A few personal items, if spending the night  If you have . . .  A pacemaker, ICD (cardiac defibrillator) or other implant: Bring the ID card.  An implanted stimulator: Bring the remote control.  A legal guardian: Bring a copy of the certified (court-stamped) guardianship papers.  Please remove any jewelry, including body piercings. Leave jewelry and other valuables at home.  If you're going home the day of surgery  You must have a responsible adult drive you home. They should stay with you overnight as well.  If you don't have someone to stay with you, and you aren't safe to go home alone, we may keep you overnight. Insurance often won't pay for this.  After surgery  If it's hard to control your pain or you need more pain medicine, please call your surgeon's office.  Questions?   If you have any questions for your care team, list them here: _________________________________________________________________________________________________________________________________________________________________________ ____________________________________ ____________________________________ ____________________________________    How to Take Your Medication Before Surgery  - Take all of your medications before surgery except as noted below    Avoid ibuprofen/aleve or aspirin the week before your procedure  Do not take your ambien the night before your procedure

## 2023-06-29 NOTE — PROGRESS NOTES
Answers for HPI/ROS submitted by the patient on 6/29/2023  If you checked off any problems, how difficult have these problems made it for you to do your work, take care of things at home, or get along with other people?: Not difficult at all  PHQ9 TOTAL SCORE: 3    [facility]  [address] [address]  Phone: [phone]  Primary Provider: Malcolm Reilly  Pre-op Performing Provider: MALCOLM REILLY      PREOPERATIVE EVALUATION:  Today's date: 6/29/2023    Kayley Godoy is a 52 year old female who presents for a preoperative evaluation.    Surgical Information:  Surgery/Procedure: Left ACL repair  Surgery Location: [facility]  Surgeon: Dr Sarah   Surgery Date: 7/11/23  Time of Surgery: tbd  Where patient plans to recover: At home with family  Fax number for surgical facility: [phone]    Assessment & Plan     The proposed surgical procedure is considered INTERMEDIATE risk.      ICD-10-CM    1. Preop general physical exam  Z01.818       2. Rupture of anterior cruciate ligament of left knee, subsequent encounter  S83.512D CBC with platelets     CBC with platelets      3. SARINA (generalized anxiety disorder)  F41.1 FLUoxetine (PROZAC) 20 MG capsule           - No identified additional risk factors other than previously addressed    Antiplatelet or Anticoagulation Medication Instructions:   - Patient is on no antiplatelet or anticoagulation medications.    Additional Medication Instructions:  Patient is to take all scheduled medications on the day of surgery EXCEPT for modifications listed below:  do not take ambien the night before the procedure    RECOMMENDATION:  APPROVAL GIVEN to proceed with proposed procedure, without further diagnostic evaluation.            Subjective       HPI related to upcoming procedure: Injured L knee in early March, planned ACL repair        6/29/2023     2:45 PM   Preop Questions   1. Have you ever had a heart attack or stroke? No   2. Have  "you ever had surgery on your heart or blood vessels, such as a stent placement, a coronary artery bypass, or surgery on an artery in your head, neck, heart, or legs? No   3. Do you have chest pain with activity? No   4. Do you have a history of  heart failure? No   5. Do you currently have a cold, bronchitis or symptoms of other infection? No   6. Do you have a cough, shortness of breath, or wheezing? No   7. Do you or anyone in your family have previous history of blood clots? No   8. Do you or does anyone in your family have a serious bleeding problem such as prolonged bleeding following surgeries or cuts? No   9. Have you ever had problems with anemia or been told to take iron pills? No   10. Have you had any abnormal blood loss such as black, tarry or bloody stools, or abnormal vaginal bleeding? No   11. Have you ever had a blood transfusion? No   12. Are you willing to have a blood transfusion if it is medically needed before, during, or after your surgery? Yes   13. Have you or any of your relatives ever had problems with anesthesia? No   14. Do you have sleep apnea, excessive snoring or daytime drowsiness? No   15. Do you have any artifical heart valves or other implanted medical devices like a pacemaker, defibrillator, or continuous glucose monitor? No   16. Do you have artificial joints? No   17. Are you allergic to latex? No   18. Is there any chance that you may be pregnant? No     Both parents with dementia and \"mom is prone to strokes\"  Neurologist said mom had a specific condition that causes this increased risk.  Also has an \"artery\" going to her brain is much smaller on one side then the other.  She will message with the name of the condition    *  Feels things are going well with the fluoxetine, feels good about that,  More motivated and interested.        Health Care Directive:  Patient does not have a Health Care Directive or Living Will: Discussed advance care planning with patient; information " "given to patient to review.    Preoperative Review of :   reviewed - controlled substances reflected in medication list.      Status of Chronic Conditions:  See problem list for active medical problems.  Problems all longstanding and stable, except as noted/documented.  See ROS for pertinent symptoms related to these conditions.      Review of Systems  Constitutional, neuro, ENT, endocrine, pulmonary, cardiac, gastrointestinal, genitourinary, musculoskeletal, integument and psychiatric systems are negative, except as otherwise noted.    Patient Active Problem List    Diagnosis Date Noted     Tubular adenoma of colon 08/05/2019     Priority: Medium     Dyspepsia 03/23/2016     Priority: Medium     Dyspepsia and other specified disorders of function of stomach 09/02/2014     Priority: Medium     Anxiety 03/01/2012     Priority: Medium     24 hour contact handout given 02/13/2012     Priority: Medium     EMERGENCY CARE PLAN  Presenting Problem Signs and Symptoms Treatment Plan    Questions or conerns during clinic hours    I will call the clinic directly     Questions or conerns outside clinic hours    I will call the 24 hour nurse line at [phone]    Patient needs to schedule an appointment    I will call the 24 hour scheduling team at [phone] or clinic directly    Same day treatment     I will call the clinic first, nurse line if after hours, urgent care and express care if needed                                 Dysthymia 02/13/2012     Priority: Medium     ANAND 3 05/19/2011     Priority: Medium     Distant h/o abnormal pap, around age 18.  Had treatment with \"laser\".  Believes it to have been a CIN3 lesion.  Had repeat biopsies and all normal follow up  4/29/16: Pap - ASCUS, Neg HPV. Plan cotest in 3 years.        CARDIOVASCULAR SCREENING; LDL GOAL LESS THAN 160 10/31/2010     Priority: Medium     Intramural leiomyoma of uterus 01/08/2008     Priority: Medium     ATYPICAL MELANOCYTIC NEVUS 03/19/2005 " "    Priority: Medium     3/19/05 Changing mole left shoulder. Biopsied by Dr. Mesa @ [facility].  4/4/05 Dr. Degroot @ [facility]: returning for removal of remnants of lesion. Prev path showed atypical melanocytic nevus.   4/6/05 Path shows no residual atypical melanocyte proliferation.        BILATERAL KNEE PAIN 02/04/2005     Priority: Medium     1/13/05 Dr. Akbar @ [facility]: Duration 4m. Plan MRI.  2/4/05 Dr. Mesa: Daily pain, R>L, feels hot to touch. Prev MRI shows some early meniscal changes in both knees, medical meniscus posterior horn. Advised glucosamine/ chondroitin.       Decreased libido 02/04/2005     Priority: Medium     2/4/05 Dr. Mesa: advised try to incr frequency of intimacies to incr receptiveness.        Past Medical History:   Diagnosis Date     Abnormal Pap smear of cervix \"In her 20's\"    Had treatment with \"laser\".  Believes it to have been a CIN3 lesion.  Had repeat biopsies and all normal follow up     ASCUS favor benign 04/29/2016    Neg HPV     Tubular adenoma of colon      Past Surgical History:   Procedure Laterality Date     CL AFF SURGICAL PATHOLOGY  1993     COLONOSCOPY N/A 7/31/2019    Procedure: Colonoscopy, With Polypectomy And Biopsy;  Surgeon: Dagoberto Ho DO;  Location: [facility]     SURGICAL HISTORY OF -   2005    mole removal left upper chest     SURGICAL HISTORY OF -   2008    endometrial ablation     ZZC APPENDECTOMY  age 13     Current Outpatient Medications   Medication Sig Dispense Refill     FLUoxetine (PROZAC) 20 MG capsule Take 1 capsule (20 mg) by mouth daily 90 capsule 1     IBUPROFEN 200 MG OR TABS Take 400 mg by mouth every 8 hours as needed        MAGNESIUM PO Take by mouth daily as needed       zolpidem (AMBIEN) 5 MG tablet Take 1 tablet (5 mg) by mouth nightly as needed for sleep 10 tablet 0     EPINEPHrine (ANY BX GENERIC EQUIV) 0.3 MG/0.3ML injection 2-pack Inject 0.3 mLs (0.3 mg) into the muscle as needed for " "anaphylaxis May repeat one time in 5-15 minutes if response to initial dose is inadequate. (Patient not taking: Reported on 6/29/2023) 2 each 0     valACYclovir (VALTREX) 1000 mg tablet 2 tabs at sx onset; 2 more 12 hours later PRN cold sores (Patient not taking: Reported on 6/29/2023) 30 tablet 3       Allergies   Allergen Reactions     Wellbutrin [Bupropion Hydrobromide] Itching        Social History     Tobacco Use     Smoking status: Former     Packs/day: 0.50     Years: 15.00     Pack years: 7.50     Types: Cigarettes     Smokeless tobacco: Never   Substance Use Topics     Alcohol use: Yes     Comment: rarely     Family History   Problem Relation Age of Onset     Cerebrovascular Disease Mother      Diabetes Father      C.A.D. Father         stents at age 70     Hypertension No family hx of      Breast Cancer No family hx of      Cancer - colorectal No family hx of      History   Drug Use No         Objective     /64   Pulse 60   Temp 98  F (36.7  C) (Tympanic)   Resp 12   Ht 1.659 m (5' 5.3\")   Wt 84.4 kg (186 lb)   SpO2 100%   BMI 30.67 kg/m      Physical Exam    GENERAL APPEARANCE: healthy, alert and no distress     EYES: EOMI, PERRL     NECK: no adenopathy, no asymmetry, masses, or scars and thyroid normal to palpation     RESP: lungs clear to auscultation - no rales, rhonchi or wheezes     CV: regular rates and rhythm, normal S1 S2, no S3 or S4 and no murmur, click or rub     MS: extremities normal- no gross deformities noted, no evidence of inflammation in joints, FROM in all extremities.     NEURO: Normal strength and tone, sensory exam grossly normal, mentation intact and speech normal     PSYCH: mentation appears normal. and affect normal/bright     LYMPHATICS: No cervical adenopathy    No results for input(s): HGB, PLT, INR, NA, POTASSIUM, CR, A1C in the last 25634 hours.     Diagnostics:  Labs pending at this time.  Results will be reviewed when available.   No EKG required, no history of " coronary heart disease, significant arrhythmia, peripheral arterial disease or other structural heart disease.    Revised Cardiac Risk Index (RCRI):  The patient has the following serious cardiovascular risks for perioperative complications:   - No serious cardiac risks = 0 points     RCRI Interpretation: 0 points: Class I (very low risk - 0.4% complication rate)           Signed Electronically by: Steph Pedraza DO  Copy of this evaluation report is provided to requesting physician.

## 2023-07-26 ENCOUNTER — TRANSFERRED RECORDS (OUTPATIENT)
Dept: HEALTH INFORMATION MANAGEMENT | Facility: CLINIC | Age: 53
End: 2023-07-26
Payer: COMMERCIAL

## 2023-07-27 ENCOUNTER — MYC REFILL (OUTPATIENT)
Dept: FAMILY MEDICINE | Facility: CLINIC | Age: 53
End: 2023-07-27
Payer: COMMERCIAL

## 2023-07-27 DIAGNOSIS — G47.00 INSOMNIA, UNSPECIFIED TYPE: ICD-10-CM

## 2023-07-28 RX ORDER — ZOLPIDEM TARTRATE 5 MG/1
5 TABLET ORAL
Qty: 10 TABLET | Refills: 0 | Status: SHIPPED | OUTPATIENT
Start: 2023-07-28 | End: 2023-10-04

## 2023-07-28 NOTE — TELEPHONE ENCOUNTER
Routing refill request to provider for review/approval because:  Drug not on the [facility] refill protocol       Requested Prescriptions   Pending Prescriptions Disp Refills    zolpidem (AMBIEN) 5 MG tablet 10 tablet 0     Sig: Take 1 tablet (5 mg) by mouth nightly as needed for sleep       There is no refill protocol information for this order              Guero Arriaga RN 07/28/23 7:28 AM

## 2023-08-24 ENCOUNTER — TRANSFERRED RECORDS (OUTPATIENT)
Dept: HEALTH INFORMATION MANAGEMENT | Facility: CLINIC | Age: 53
End: 2023-08-24
Payer: COMMERCIAL

## 2023-08-30 ENCOUNTER — MYC MEDICAL ADVICE (OUTPATIENT)
Dept: FAMILY MEDICINE | Facility: CLINIC | Age: 53
End: 2023-08-30
Payer: COMMERCIAL

## 2023-10-04 ENCOUNTER — MYC REFILL (OUTPATIENT)
Dept: FAMILY MEDICINE | Facility: CLINIC | Age: 53
End: 2023-10-04
Payer: COMMERCIAL

## 2023-10-04 DIAGNOSIS — G47.00 INSOMNIA, UNSPECIFIED TYPE: ICD-10-CM

## 2023-10-06 RX ORDER — ZOLPIDEM TARTRATE 5 MG/1
5 TABLET ORAL
Qty: 10 TABLET | Refills: 0 | Status: SHIPPED | OUTPATIENT
Start: 2023-10-06 | End: 2023-11-06

## 2023-10-11 ENCOUNTER — E-VISIT (OUTPATIENT)
Dept: FAMILY MEDICINE | Facility: CLINIC | Age: 53
End: 2023-10-11
Payer: COMMERCIAL

## 2023-10-11 DIAGNOSIS — F41.1 GAD (GENERALIZED ANXIETY DISORDER): Primary | ICD-10-CM

## 2023-10-11 DIAGNOSIS — F33.1 MODERATE EPISODE OF RECURRENT MAJOR DEPRESSIVE DISORDER (H): ICD-10-CM

## 2023-10-11 PROCEDURE — 99421 OL DIG E/M SVC 5-10 MIN: CPT | Performed by: FAMILY MEDICINE

## 2023-10-11 RX ORDER — FLUOXETINE 40 MG/1
40 CAPSULE ORAL DAILY
Qty: 30 CAPSULE | Refills: 1 | Status: SHIPPED | OUTPATIENT
Start: 2023-10-11 | End: 2024-01-01

## 2023-10-11 ASSESSMENT — PATIENT HEALTH QUESTIONNAIRE - PHQ9
SUM OF ALL RESPONSES TO PHQ QUESTIONS 1-9: 16
10. IF YOU CHECKED OFF ANY PROBLEMS, HOW DIFFICULT HAVE THESE PROBLEMS MADE IT FOR YOU TO DO YOUR WORK, TAKE CARE OF THINGS AT HOME, OR GET ALONG WITH OTHER PEOPLE: SOMEWHAT DIFFICULT
SUM OF ALL RESPONSES TO PHQ QUESTIONS 1-9: 16

## 2023-10-11 ASSESSMENT — ANXIETY QUESTIONNAIRES
GAD7 TOTAL SCORE: 4
2. NOT BEING ABLE TO STOP OR CONTROL WORRYING: MORE THAN HALF THE DAYS
3. WORRYING TOO MUCH ABOUT DIFFERENT THINGS: SEVERAL DAYS
7. FEELING AFRAID AS IF SOMETHING AWFUL MIGHT HAPPEN: NOT AT ALL
GAD7 TOTAL SCORE: 4
5. BEING SO RESTLESS THAT IT IS HARD TO SIT STILL: NOT AT ALL
1. FEELING NERVOUS, ANXIOUS, OR ON EDGE: NOT AT ALL
4. TROUBLE RELAXING: SEVERAL DAYS
6. BECOMING EASILY ANNOYED OR IRRITABLE: NOT AT ALL

## 2023-10-12 ASSESSMENT — PATIENT HEALTH QUESTIONNAIRE - PHQ9: SUM OF ALL RESPONSES TO PHQ QUESTIONS 1-9: 16

## 2023-10-12 ASSESSMENT — ANXIETY QUESTIONNAIRES: GAD7 TOTAL SCORE: 4

## 2023-11-06 DIAGNOSIS — G47.00 INSOMNIA, UNSPECIFIED TYPE: ICD-10-CM

## 2023-11-07 RX ORDER — ZOLPIDEM TARTRATE 5 MG/1
5 TABLET ORAL
Qty: 10 TABLET | Refills: 0 | Status: SHIPPED | OUTPATIENT
Start: 2023-11-07 | End: 2023-11-15

## 2023-11-15 ENCOUNTER — MYC REFILL (OUTPATIENT)
Dept: FAMILY MEDICINE | Facility: CLINIC | Age: 53
End: 2023-11-15
Payer: COMMERCIAL

## 2023-11-15 DIAGNOSIS — G47.00 INSOMNIA, UNSPECIFIED TYPE: ICD-10-CM

## 2023-11-17 RX ORDER — ZOLPIDEM TARTRATE 5 MG/1
5 TABLET ORAL
Qty: 10 TABLET | Refills: 0 | Status: SHIPPED | OUTPATIENT
Start: 2023-11-17 | End: 2023-12-12

## 2023-12-12 ENCOUNTER — MYC REFILL (OUTPATIENT)
Dept: FAMILY MEDICINE | Facility: CLINIC | Age: 53
End: 2023-12-12
Payer: COMMERCIAL

## 2023-12-12 DIAGNOSIS — G47.00 INSOMNIA, UNSPECIFIED TYPE: ICD-10-CM

## 2023-12-13 RX ORDER — ZOLPIDEM TARTRATE 5 MG/1
5 TABLET ORAL
Qty: 10 TABLET | Refills: 0 | Status: SHIPPED | OUTPATIENT
Start: 2023-12-13 | End: 2024-01-01

## 2023-12-29 ENCOUNTER — PATIENT OUTREACH (OUTPATIENT)
Dept: GASTROENTEROLOGY | Facility: CLINIC | Age: 53
End: 2023-12-29
Payer: COMMERCIAL

## 2024-01-28 ENCOUNTER — HEALTH MAINTENANCE LETTER (OUTPATIENT)
Age: 54
End: 2024-01-28

## 2024-02-02 SDOH — HEALTH STABILITY: PHYSICAL HEALTH: ON AVERAGE, HOW MANY DAYS PER WEEK DO YOU ENGAGE IN MODERATE TO STRENUOUS EXERCISE (LIKE A BRISK WALK)?: 1 DAY

## 2024-02-02 SDOH — HEALTH STABILITY: PHYSICAL HEALTH: ON AVERAGE, HOW MANY MINUTES DO YOU ENGAGE IN EXERCISE AT THIS LEVEL?: 30 MIN

## 2024-02-02 ASSESSMENT — SOCIAL DETERMINANTS OF HEALTH (SDOH): HOW OFTEN DO YOU GET TOGETHER WITH FRIENDS OR RELATIVES?: ONCE A WEEK

## 2024-02-09 ENCOUNTER — OFFICE VISIT (OUTPATIENT)
Dept: FAMILY MEDICINE | Facility: CLINIC | Age: 54
End: 2024-02-09
Payer: COMMERCIAL

## 2024-02-09 VITALS
HEIGHT: 65 IN | TEMPERATURE: 98 F | SYSTOLIC BLOOD PRESSURE: 122 MMHG | OXYGEN SATURATION: 98 % | BODY MASS INDEX: 30.72 KG/M2 | DIASTOLIC BLOOD PRESSURE: 74 MMHG | HEART RATE: 73 BPM | WEIGHT: 184.4 LBS | RESPIRATION RATE: 16 BRPM

## 2024-02-09 DIAGNOSIS — Z12.4 CERVICAL CANCER SCREENING: ICD-10-CM

## 2024-02-09 DIAGNOSIS — G47.00 INSOMNIA, UNSPECIFIED TYPE: ICD-10-CM

## 2024-02-09 DIAGNOSIS — Z13.6 CARDIOVASCULAR SCREENING; LDL GOAL LESS THAN 160: ICD-10-CM

## 2024-02-09 DIAGNOSIS — N95.2 VAGINAL ATROPHY: ICD-10-CM

## 2024-02-09 DIAGNOSIS — F33.1 MODERATE EPISODE OF RECURRENT MAJOR DEPRESSIVE DISORDER (H): ICD-10-CM

## 2024-02-09 DIAGNOSIS — Z00.00 ROUTINE GENERAL MEDICAL EXAMINATION AT A HEALTH CARE FACILITY: Primary | ICD-10-CM

## 2024-02-09 DIAGNOSIS — F41.1 GAD (GENERALIZED ANXIETY DISORDER): ICD-10-CM

## 2024-02-09 DIAGNOSIS — Z12.31 VISIT FOR SCREENING MAMMOGRAM: ICD-10-CM

## 2024-02-09 DIAGNOSIS — Z13.1 SCREENING FOR DIABETES MELLITUS: ICD-10-CM

## 2024-02-09 DIAGNOSIS — Z12.11 SPECIAL SCREENING FOR MALIGNANT NEOPLASMS, COLON: ICD-10-CM

## 2024-02-09 PROCEDURE — 99396 PREV VISIT EST AGE 40-64: CPT | Mod: 25 | Performed by: FAMILY MEDICINE

## 2024-02-09 PROCEDURE — 96127 BRIEF EMOTIONAL/BEHAV ASSMT: CPT | Performed by: FAMILY MEDICINE

## 2024-02-09 PROCEDURE — 90686 IIV4 VACC NO PRSV 0.5 ML IM: CPT | Performed by: FAMILY MEDICINE

## 2024-02-09 PROCEDURE — 90471 IMMUNIZATION ADMIN: CPT | Performed by: FAMILY MEDICINE

## 2024-02-09 PROCEDURE — G0145 SCR C/V CYTO,THINLAYER,RESCR: HCPCS | Performed by: FAMILY MEDICINE

## 2024-02-09 PROCEDURE — 87624 HPV HI-RISK TYP POOLED RSLT: CPT | Performed by: FAMILY MEDICINE

## 2024-02-09 RX ORDER — FLUOXETINE 40 MG/1
40 CAPSULE ORAL DAILY
Qty: 90 CAPSULE | Refills: 1 | Status: SHIPPED | OUTPATIENT
Start: 2024-02-09 | End: 2024-04-23

## 2024-02-09 RX ORDER — ZOLPIDEM TARTRATE 5 MG/1
5 TABLET ORAL
Qty: 10 TABLET | Refills: 0 | Status: SHIPPED | OUTPATIENT
Start: 2024-02-09 | End: 2024-03-01

## 2024-02-09 RX ORDER — ESTRADIOL 10 UG/1
10 INSERT VAGINAL
Qty: 24 TABLET | Refills: 3 | Status: SHIPPED | OUTPATIENT
Start: 2024-02-12 | End: 2024-09-24

## 2024-02-09 ASSESSMENT — PATIENT HEALTH QUESTIONNAIRE - PHQ9
10. IF YOU CHECKED OFF ANY PROBLEMS, HOW DIFFICULT HAVE THESE PROBLEMS MADE IT FOR YOU TO DO YOUR WORK, TAKE CARE OF THINGS AT HOME, OR GET ALONG WITH OTHER PEOPLE: NOT DIFFICULT AT ALL
SUM OF ALL RESPONSES TO PHQ QUESTIONS 1-9: 5
SUM OF ALL RESPONSES TO PHQ QUESTIONS 1-9: 5

## 2024-02-09 NOTE — PROGRESS NOTES
Preventive Care Visit  [facility]  Feb 9, 2024    A/P:      ICD-10-CM    1. Routine general medical examination at a health care facility  Z00.00       2. SARINA (generalized anxiety disorder)  F41.1 FLUoxetine (PROZAC) 40 MG capsule      3. Moderate episode of recurrent major depressive disorder (H)  F33.1 FLUoxetine (PROZAC) 40 MG capsule      4. Vaginal atrophy  N95.2 estradiol (VAGIFEM) 10 MCG TABS vaginal tablet      5. Insomnia, unspecified type  G47.00 zolpidem (AMBIEN) 5 MG tablet      6. Visit for screening mammogram  Z12.31 MA SCREENING DIGITAL BILAT - Future  (s+30)      7. Cervical cancer screening  Z12.4 Pap Screen with HPV - recommended age 30 - 65 years     HPV Hold (Lab Only)      8. Screening for diabetes mellitus  Z13.1 Glucose      9. CARDIOVASCULAR SCREENING; LDL GOAL LESS THAN 160  Z13.6 Lipid panel reflex to direct LDL Fasting      10. Special screening for malignant neoplasms, colon  Z12.11 Colonoscopy Screening  Referral        SARINA/depression:  pt feels stable at current dose.  Therapy encouraged.    Vaginal dryness:  trial of estrogen tablet.  Reviewed side effects, risk and time to effect.  Follow-up prn    Insomnia:  chronic and intermittent with travel.  Med refilled.        Subjective   Kayley is a 53 year old, presenting for the following:  Physical        2/9/2024    11:39 AM   Additional Questions   Roomed by Kayley ZORAN   Accompanied by self        Health Care Directive  Patient does not have a Health Care Directive or Living Will: Discussed advance care planning with patient; information given to patient to review.    HPI      Mom passed in Nov.  Had 3-4 weeks where sleep was really hard.  She used Ambien then which helped.  Feels things have settled down some.  Dad in memory care in [place].  Heading out of town now which is when she usually uses the ambien.  Does need a refill    Feeling much better on the 40mg of fluoxetine.  " Feels like she worries about taking the medication.  Worries it is \"messing with her\".  Has had a lot more anxiety the last few years.  Wonders if this is related to menopause.  Getting more anxious at bedtime, feels much better when her  comes to bed.  Has been more prevalent since her mom passed.  Used to only happen when she travels.  Feels the fluoxetine is working well  would like to continue this dose.  Has not been in therapy but considering.  Would like to do that through her [facility]    Vaginal dryness remains an issue, even with lubricant.  Tried the Estring but fell out.  Wondering about other options.                2/2/2024   General Health   How would you rate your overall physical health? (!) FAIR   Feel stress (tense, anxious, or unable to sleep) Rather much   (!) STRESS CONCERN      2/2/2024   Nutrition   Three or more servings of calcium each day? (!) NO   Diet: Regular (no restrictions)   How many servings of fruit and vegetables per day? (!) 0-1   How many sweetened beverages each day? (!) 2         2/2/2024   Exercise   Days per week of moderate/strenous exercise 1 day   Average minutes spent exercising at this level 30 min   (!) EXERCISE CONCERN      2/2/2024   Social Factors   Frequency of gathering with friends or relatives Once a week   Worry food won't last until get money to buy more No   Food not last or not have enough money for food? No   Do you have housing?  Yes   Are you worried about losing your housing? No   Lack of transportation? No   Unable to get utilities (heat,electricity)? No         2/2/2024   Fall Risk   Fallen 2 or more times in the past year? No   Trouble with walking or balance? No          2/2/2024   Dental   Dentist two times every year? (!) NO         2/2/2024   TB Screening   Were you born outside of [place]?  No       Today's PHQ-9 Score:       2/9/2024    11:39 AM   PHQ-9 SCORE   PHQ-9 Total Score MyChart 5 (Mild depression)   PHQ-9 Total Score 5         " "2/2/2024   Substance Use   Alcohol more than 3/day or more than 7/wk No   Do you use any other substances recreationally? No     Social History     Tobacco Use    Smoking status: Former     Packs/day: 0.50     Years: 15.00     Additional pack years: 0.00     Total pack years: 7.50     Types: Cigarettes    Smokeless tobacco: Never   Substance Use Topics    Alcohol use: Yes     Comment: rarely    Drug use: No             2/2/2024   Breast Cancer Screening   Family history of breast, colon, or ovarian cancer? No / Unknown          No data to display                 Mammogram Screening - Mammogram every 1-2 years updated in Health Maintenance based on mutual decision making        2/2/2024   STI Screening   New sexual partner(s) since last STI/HIV test? No     History of abnormal Pap smear: YES - updated in Problem List and Health Maintenance accordingly        Latest Ref Rng & Units 2/9/2024    12:22 PM 4/26/2019     8:46 AM 4/26/2019     8:40 AM   PAP / HPV   PAP  Negative for Intraepithelial Lesion or Malignancy (NILM)      PAP (Historical)   NIL     HPV 16 DNA NEG^Negative   Negative    HPV 18 DNA NEG^Negative   Negative    Other HR HPV NEG^Negative   Negative      The ASCVD Risk score (Loretta RIVERA, et al., 2019) failed to calculate for the following reasons:    Cannot find a previous HDL lab    Cannot find a previous total cholesterol lab           Reviewed and updated as needed this visit by Provider   Tobacco  Allergies  Meds                Past Medical History:   Diagnosis Date    Abnormal Pap smear of cervix \"In her 20's\"    Had treatment with \"laser\".  Believes it to have been a CIN3 lesion.  Had repeat biopsies and all normal follow up    ASCUS favor benign 04/29/2016    Neg HPV    Tubular adenoma of colon      Past Surgical History:   Procedure Laterality Date    BIOPSY      CL AFF SURGICAL PATHOLOGY  1993    COLONOSCOPY N/A 07/31/2019    Procedure: Colonoscopy, With Polypectomy And Biopsy;  Surgeon: " "Dagoberto Ho DO;  Location: [facility]    GYN SURGERY      SURGICAL HISTORY OF -   2005    mole removal left upper chest    SURGICAL HISTORY OF -   2008    endometrial ablation    ZZC APPENDECTOMY  age 13     Review of Systems    Review of Systems  Constitutional, HEENT, cardiovascular, pulmonary, gi and gu systems are negative, except as otherwise noted.     Objective    Exam  /74   Pulse 73   Temp 98  F (36.7  C) (Tympanic)   Resp 16   Ht 1.659 m (5' 5.3\")   Wt 83.6 kg (184 lb 6.4 oz)   SpO2 98%   BMI 30.40 kg/m     Estimated body mass index is 30.4 kg/m  as calculated from the following:    Height as of this encounter: 1.659 m (5' 5.3\").    Weight as of this encounter: 83.6 kg (184 lb 6.4 oz).    Physical Exam  GENERAL: alert and no distress  EYES: Eyes grossly normal to inspection, PERRL and conjunctivae and sclerae normal  NECK: no adenopathy, no asymmetry, masses, or scars  RESP: lungs clear to auscultation - no rales, rhonchi or wheezes  BREAST: normal without masses, tenderness or nipple discharge and no palpable axillary masses or adenopathy  CV: regular rate and rhythm, normal S1 S2, no S3 or S4, no murmur, click or rub, no peripheral edema  ABDOMEN: soft, nontender, no hepatosplenomegaly, no masses and bowel sounds normal   (female) w/bimanual: normal female external genitalia, normal urethral meatus, mildly atrophic vaginal mucosa, and normal cervix/adnexa/uterus without masses or discharge  MS: no gross musculoskeletal defects noted, no edema  NEURO: Normal strength and tone, mentation intact and speech normal  PSYCH: mentation appears normal, affect normal/bright      Signed Electronically by: Steph Pedraza DO      Answers submitted by the patient for this visit:  Patient Health Questionnaire (Submitted on 2/9/2024)  If you checked off any problems, how difficult have these problems made it for you to do your work, take care of things at home, or get along with other people?: " Not difficult at all  PHQ9 TOTAL SCORE: 5

## 2024-02-09 NOTE — PATIENT INSTRUCTIONS
Your Health Risk Assessment indicates you feel you are not in good health    A healthy lifestyle helps keep the body fit and the mind alert. It helps protect you from disease, helps you fight disease, and helps prevent chronic disease (disease that doesn't go away) from getting worse. This is important as you get older and begin to notice twinges in muscles and joints and a decline in the strength and stamina you once took for granted. A healthy lifestyle includes good healthcare, good nutrition, weight control, recreation, and regular exercise. Avoid harmful substances and do what you can to keep safe. Another part of a healthy lifestyle is stay mentally active and socially involved.    Good healthcare   Have a wellness visit every year.   If you have new symptoms, let us know right away. Don't wait until the next checkup.   Take medicines exactly as prescribed and keep your medicines in a safe place. Tell us if your medicine causes problems.   Healthy diet and weight control   Eat 3 or 4 small, nutritious, low-fat, high-fiber meals a day. Include a variety of fruits, vegetables, and whole-grain foods.   Make sure you get enough calcium in your diet. Calcium, vitamin D, and exercise help prevent osteoporosis (bone thinning).   If you live alone, try eating with others when you can. That way you get a good meal and have company while you eat it.   Try to keep a healthy weight. If you eat more calories than your body uses for energy, it will be stored as fat and you will gain weight.     Recreation   Recreation is not limited to sports and team events. It includes any activity that provides relaxation, interest, enjoyment, and exercise. Recreation provides an outlet for physical, mental, and social energy. It can give a sense of worth and achievement. It can help you stay healthy.    Mental Exercise and Social Involvement  Mental and emotional health is as important as physical health. Keep in touch with friends and  family. Stay as active as possible. Continue to learn and challenge yourself.   Things you can do to stay mentally active are:  Learn something new, like a foreign language or musical instrument.   Play SCRABBLE or do crossword puzzles. If you cannot find people to play these games with you at home, you can play them with others on your computer through the Internet.   Join a games club--anything from card games to chess or checkers or lawn bowling.   Start a new hobby.   Go back to school.   Volunteer.   Read.   Keep up with world events.  Eating Healthy Foods: Care Instructions  With every meal, you can make healthy food choices. Try to eat a variety of fruits, vegetables, whole grains, lean proteins, and low-fat dairy products. This can help you get the right balance of nutrients, including vitamins and minerals. Small changes add up over time. You can start by adding one healthy food to your meals each day.    Try to make half your plate fruits and vegetables, one-fourth whole grains, and one-fourth lean proteins. Try including dairy with your meals.   Eat more fruits and vegetables. Try to have them with most meals and snacks.   Foods for healthy eating    Fruits    These can be fresh, frozen, canned, or dried.  Try to choose whole fruit rather than fruit juice.  Eat a variety of colors.    Vegetables    These can be fresh, frozen, canned, or dried.  Beans, peas, and lentils count too.    Whole grains    Choose whole-grain breads, cereals, and noodles.  Try brown rice.    Lean proteins    These can include lean meat, poultry, fish, and eggs.  You can also have tofu, beans, peas, lentils, nuts, and seeds.    Dairy    Try milk, yogurt, and cheese.  Choose low-fat or fat-free when you can.  If you need to, use lactose-free milk or fortified plant-based milk products, such as soy milk.    Water    Drink water when you're thirsty.  Limit sugar-sweetened drinks, including soda, fruit drinks, and sports drinks.  Where  "can you learn more?  Go to https://www.[employer].net/patiented  Enter T756 in the search box to learn more about \"Eating Healthy Foods: Care Instructions.\"  Current as of: February 28, 2023               Content Version: 13.8    3095-6478 [employer].   Care instructions adapted under license by your healthcare professional. If you have questions about a medical condition or this instruction, always ask your healthcare professional. [employer] disclaims any warranty or liability for your use of this information.      Learning About Stress  What is stress?     Stress is your body's response to a hard situation. Your body can have a physical, emotional, or mental response. Stress is a fact of life for most people, and it affects everyone differently. What causes stress for you may not be stressful for someone else.  A lot of things can cause stress. You may feel stress when you go on a job interview, take a test, or run a race. This kind of short-term stress is normal and even useful. It can help you if you need to work hard or react quickly. For example, stress can help you finish an important job on time.  Long-term stress is caused by ongoing stressful situations or events. Examples of long-term stress include long-term health problems, ongoing problems at work, or conflicts in your family. Long-term stress can harm your health.  How does stress affect your health?  When you are stressed, your body responds as though you are in danger. It makes hormones that speed up your heart, make you breathe faster, and give you a burst of energy. This is called the fight-or-flight stress response. If the stress is over quickly, your body goes back to normal and no harm is done.  But if stress happens too often or lasts too long, it can have bad effects. Long-term stress can make you more likely to get sick, and it can make symptoms of some diseases worse. If you tense up when you are stressed, you " may develop neck, shoulder, or low back pain. Stress is linked to high blood pressure and heart disease.  Stress also harms your emotional health. It can make you vital, tense, or depressed. Your relationships may suffer, and you may not do well at work or school.  What can you do to manage stress?  You can try these things to help manage stress:   Do something active. Exercise or activity can help reduce stress. Walking is a great way to get started. Even everyday activities such as housecleaning or yard work can help.  Try yoga or martinez chi. These techniques combine exercise and meditation. You may need some training at first to learn them.  Do something you enjoy. For example, listen to music or go to a movie. Practice your hobby or do volunteer work.  Meditate. This can help you relax, because you are not worrying about what happened before or what may happen in the future.  Do guided imagery. Imagine yourself in any setting that helps you feel calm. You can use online videos, books, or a teacher to guide you.  Do breathing exercises. For example:  From a standing position, bend forward from the waist with your knees slightly bent. Let your arms dangle close to the floor.  Breathe in slowly and deeply as you return to a standing position. Roll up slowly and lift your head last.  Hold your breath for just a few seconds in the standing position.  Breathe out slowly and bend forward from the waist.  Let your feelings out. Talk, laugh, cry, and express anger when you need to. Talking with supportive friends or family, a counselor, or a rahat leader about your feelings is a healthy way to relieve stress. Avoid discussing your feelings with people who make you feel worse.  Write. It may help to write about things that are bothering you. This helps you find out how much stress you feel and what is causing it. When you know this, you can find better ways to cope.  What can you do to prevent stress?  You might try some of  "these things to help prevent stress:  Manage your time. This helps you find time to do the things you want and need to do.  Get enough sleep. Your body recovers from the stresses of the day while you are sleeping.  Get support. Your family, friends, and community can make a difference in how you experience stress.  Limit your news feed. Avoid or limit time on social media or news that may make you feel stressed.  Do something active. Exercise or activity can help reduce stress. Walking is a great way to get started.  Where can you learn more?  Go to https://www.[employer].net/patiented  Enter N032 in the search box to learn more about \"Learning About Stress.\"  Current as of: February 26, 2023               Content Version: 13.8    8729-0312 [employer].   Care instructions adapted under license by your healthcare professional. If you have questions about a medical condition or this instruction, always ask your healthcare professional. [employer] disclaims any warranty or liability for your use of this information.      Preventive Care Advice   This is general advice given by our system to help you stay healthy. However, your care team may have specific advice just for you. Please talk to your care team about your preventive care needs.  Nutrition  Eat 5 or more servings of fruits and vegetables each day.  Try wheat bread, brown rice and whole grain pasta (instead of white bread, rice, and pasta).  Get enough calcium and vitamin D. Check the label on foods and aim for 100% of the RDA (recommended daily allowance).  Lifestyle  Exercise at least 150 minutes each week  (30 minutes a day, 5 days a week).  Do muscle strengthening activities 2 days a week. These help control your weight and prevent disease.  No smoking.  Wear sunscreen to prevent skin cancer.  Have a dental exam and cleaning every 6 months.  Yearly exams  See your health care team every year to talk about:  Any changes in your " health.  Any medicines your care team has prescribed.  Preventive care, family planning, and ways to prevent chronic diseases.  Shots (vaccines)   HPV shots (up to age 26), if you've never had them before.  Hepatitis B shots (up to age 59), if you've never had them before.  COVID-19 shot: Get this shot when it's due.  Flu shot: Get a flu shot every year.  Tetanus shot: Get a tetanus shot every 10 years.  Pneumococcal, hepatitis A, and RSV shots: Ask your care team if you need these based on your risk.  Shingles shot (for age 50 and up)  General health tests  Diabetes screening:  Starting at age 35, Get screened for diabetes at least every 3 years.  If you are younger than age 35, ask your care team if you should be screened for diabetes.  Cholesterol test: At age 39, start having a cholesterol test every 5 years, or more often if advised.  Bone density scan (DEXA): At age 50, ask your care team if you should have this scan for osteoporosis (brittle bones).  Hepatitis C: Get tested at least once in your life.  STIs (sexually transmitted infections)  Before age 24: Ask your care team if you should be screened for STIs.  After age 24: Get screened for STIs if you're at risk. You are at risk for STIs (including HIV) if:  You are sexually active with more than one person.  You don't use condoms every time.  You or a partner was diagnosed with a sexually transmitted infection.  If you are at risk for HIV, ask about PrEP medicine to prevent HIV.  Get tested for HIV at least once in your life, whether you are at risk for HIV or not.  Cancer screening tests  Cervical cancer screening: If you have a cervix, begin getting regular cervical cancer screening tests starting at age 21.  Breast cancer scan (mammogram): If you've ever had breasts, begin having regular mammograms starting at age 40. This is a scan to check for breast cancer.  Colon cancer screening: It is important to start screening for colon cancer at age 45.  Have  a colonoscopy test every 10 years (or more often if you're at risk) Or, ask your provider about stool tests like a FIT test every year or Cologuard test every 3 years.  To learn more about your testing options, visit:   https://www.[employer]/967492.pdf.  For help making a decision, visit:   https://bit.[employer]/eh43195.  Prostate cancer screening test: If you have a prostate, ask your care team if a prostate cancer screening test (PSA) at age 55 is right for you.  Lung cancer screening: If you are a current or former smoker ages 50 to 80, ask your care team if ongoing lung cancer screenings are right for you.  For informational purposes only. Not to replace the advice of your health care provider. Copyright   2023 [employer]. All rights reserved. Clinically reviewed by the [facility] Transitions Program. Raw[employer] [id number] - REV 01/24.

## 2024-02-15 LAB
BKR LAB AP GYN ADEQUACY: NORMAL
BKR LAB AP GYN INTERPRETATION: NORMAL
BKR LAB AP HPV REFLEX: NORMAL
BKR LAB AP PREVIOUS ABNORMAL: NORMAL
PATH REPORT.COMMENTS IMP SPEC: NORMAL
PATH REPORT.COMMENTS IMP SPEC: NORMAL
PATH REPORT.RELEVANT HX SPEC: NORMAL

## 2024-02-22 LAB
HUMAN PAPILLOMA VIRUS 16 DNA: NEGATIVE
HUMAN PAPILLOMA VIRUS 18 DNA: NEGATIVE
HUMAN PAPILLOMA VIRUS FINAL DIAGNOSIS: NORMAL
HUMAN PAPILLOMA VIRUS OTHER HR: NEGATIVE

## 2024-03-01 DIAGNOSIS — G47.00 INSOMNIA, UNSPECIFIED TYPE: ICD-10-CM

## 2024-03-01 RX ORDER — ZOLPIDEM TARTRATE 5 MG/1
5 TABLET ORAL
Qty: 10 TABLET | Refills: 0 | Status: SHIPPED | OUTPATIENT
Start: 2024-03-01 | End: 2024-03-28

## 2024-03-01 NOTE — TELEPHONE ENCOUNTER
Patient sent a MyChart with a request for refill of Zolpidem and Fluoxetine.   Patient has a refill of Fluoxetine at the [facility]. Advised that she transfer it to the [facility].    Will send a request for Zolpidem.

## 2024-03-27 ENCOUNTER — MYC REFILL (OUTPATIENT)
Dept: DERMATOLOGY | Facility: CLINIC | Age: 54
End: 2024-03-27
Payer: COMMERCIAL

## 2024-03-27 DIAGNOSIS — B00.9 HSV INFECTION: ICD-10-CM

## 2024-03-27 DIAGNOSIS — G47.00 INSOMNIA, UNSPECIFIED TYPE: ICD-10-CM

## 2024-03-27 NOTE — LETTER
[facility]   [address] [place]  [address]  Phone: [phone]        Kayley Godoy  [address] [place] [phone]    We recently provided you with a medication refill. Prescription medications require routine follow-up appointments with your Dermatology Provider.      Per [facility] medication refill protocol, you do need to be seen at least annually to renew a prescription while on prescribed medication(s). A prescription is valid for only one year before it expires.      At this time we ask that: You schedule a routine follow up Dermatology office visit to  renew your now  Valtrex prescription.    Your prescription: Has  as it is over 1 year old. You have been given a one time any refill of medication.     We are currently booking Dermatology appointments into SEPTEMBER, so please schedule follow up Dermatology appointment as soon as possible to avoid going without medication.    [phone] to schedule a Dermatology appointment-all locations. You may be seen for follow up with any of our 3 Dermatology Providers via telephone or virtual video if you prefer-as long as you have been seen in person once in the last 3 years..     Sincerely,     Cherelle Bell PA-C/keren

## 2024-03-28 RX ORDER — VALACYCLOVIR HYDROCHLORIDE 1 G/1
TABLET, FILM COATED ORAL
Qty: 30 TABLET | Refills: 0 | Status: SHIPPED | OUTPATIENT
Start: 2024-03-28

## 2024-03-28 RX ORDER — ZOLPIDEM TARTRATE 5 MG/1
5 TABLET ORAL
Qty: 10 TABLET | Refills: 0 | Status: SHIPPED | OUTPATIENT
Start: 2024-03-28 | End: 2024-04-23

## 2024-04-23 ENCOUNTER — MYC REFILL (OUTPATIENT)
Dept: FAMILY MEDICINE | Facility: CLINIC | Age: 54
End: 2024-04-23
Payer: COMMERCIAL

## 2024-04-23 DIAGNOSIS — G47.00 INSOMNIA, UNSPECIFIED TYPE: ICD-10-CM

## 2024-04-23 DIAGNOSIS — F33.1 MODERATE EPISODE OF RECURRENT MAJOR DEPRESSIVE DISORDER (H): ICD-10-CM

## 2024-04-23 DIAGNOSIS — F41.1 GAD (GENERALIZED ANXIETY DISORDER): ICD-10-CM

## 2024-04-23 RX ORDER — FLUOXETINE 40 MG/1
40 CAPSULE ORAL DAILY
Qty: 90 CAPSULE | Refills: 1 | Status: SHIPPED | OUTPATIENT
Start: 2024-04-23 | End: 2024-08-07

## 2024-04-25 RX ORDER — ZOLPIDEM TARTRATE 5 MG/1
5 TABLET ORAL
Qty: 10 TABLET | Refills: 0 | Status: SHIPPED | OUTPATIENT
Start: 2024-04-25 | End: 2024-05-20

## 2024-05-03 ENCOUNTER — ANESTHESIA EVENT (OUTPATIENT)
Dept: GASTROENTEROLOGY | Facility: CLINIC | Age: 54
End: 2024-05-03
Payer: COMMERCIAL

## 2024-05-03 NOTE — ANESTHESIA PREPROCEDURE EVALUATION
"Anesthesia Pre-Procedure Evaluation    Patient: Kayley Godoy   MRN: [id number] : 1970        Procedure : Procedure(s):  Colonoscopy          Past Medical History:   Diagnosis Date     Abnormal Pap smear of cervix \"In her 20's\"    Had treatment with \"laser\".  Believes it to have been a CIN3 lesion.  Had repeat biopsies and all normal follow up     ASCUS favor benign 2016    Neg HPV     Tubular adenoma of colon       Past Surgical History:   Procedure Laterality Date     BIOPSY       [facility] SURGICAL PATHOLOGY       COLONOSCOPY N/A 2019    Procedure: Colonoscopy, With Polypectomy And Biopsy;  Surgeon: Dagoberto Ho DO;  Location: [facility]     GYN SURGERY       SURGICAL HISTORY OF -       mole removal left upper chest     SURGICAL HISTORY OF -       endometrial ablation     ZZC APPENDECTOMY  age 13      Allergies   Allergen Reactions     Wellbutrin [Bupropion Hydrobromide] Itching      Social History     Tobacco Use     Smoking status: Former     Current packs/day: 0.50     Average packs/day: 0.5 packs/day for 15.0 years (7.5 ttl pk-yrs)     Types: Cigarettes     Smokeless tobacco: Never   Substance Use Topics     Alcohol use: Yes     Comment: rarely      Wt Readings from Last 1 Encounters:   24 83.6 kg (184 lb 6.4 oz)        Anesthesia Evaluation   Pt has had prior anesthetic. Type: MAC and General.        ROS/MED HX  ENT/Pulmonary:       Neurologic:  - neg neurologic ROS     Cardiovascular:  - neg cardiovascular ROS     METS/Exercise Tolerance:     Hematologic:  - neg hematologic  ROS     Musculoskeletal:  - neg musculoskeletal ROS     GI/Hepatic:  - neg GI/hepatic ROS     Renal/Genitourinary:  - neg Renal ROS     Endo:     (+)          thyroid problem,            Psychiatric/Substance Use:     (+) psychiatric history anxiety and depression       Infectious Disease:  - neg infectious disease ROS     Malignancy:  - neg malignancy ROS     Other:            Physical " "Exam    Airway         TM distance: > 3 FB   Neck ROM: full   Mouth opening: > 3 cm    Respiratory Devices and Support         Dental    unable to assess        Cardiovascular   cardiovascular exam normal          Pulmonary   pulmonary exam normal            OUTSIDE LABS:  CBC:   Lab Results   Component Value Date    WBC 8.1 06/29/2023    WBC 5.6 07/29/2014    HGB 12.5 06/29/2023    HGB 13.5 07/29/2014    HCT 37.2 06/29/2023    HCT 39.4 07/29/2014     06/29/2023     07/29/2014     BMP:   Lab Results   Component Value Date     07/29/2014    POTASSIUM 3.8 07/29/2014    CHLORIDE 101 07/29/2014    CO2 30 07/29/2014    BUN 13 07/29/2014    CR 0.74 07/29/2014    GLC 94 07/29/2014     COAGS: No results found for: \"PTT\", \"INR\", \"FIBR\"  POC:   Lab Results   Component Value Date    HCG Negative 01/24/2008    HCGS Canceled, Test credited 07/31/2019     HEPATIC:   Lab Results   Component Value Date    ALBUMIN 4.6 07/29/2014    PROTTOTAL 7.5 07/29/2014    ALT 27 07/29/2014    AST 21 07/29/2014    ALKPHOS 63 07/29/2014    BILITOTAL 0.5 07/29/2014     OTHER:   Lab Results   Component Value Date    MIKE 9.4 07/29/2014    LIPASE 65 07/29/2014    TSH 1.64 02/04/2005       Anesthesia Plan    ASA Status:  2       Anesthesia Type: General.   Induction: Intravenous.   Maintenance: TIVA.        Consents    Anesthesia Plan(s) and associated risks, benefits, and realistic alternatives discussed. Questions answered and patient/representative(s) expressed understanding.     - Discussed: Risks, Benefits and Alternatives for BOTH SEDATION and the PROCEDURE were discussed     - Discussed with:  Patient            Postoperative Care    Pain management: IV analgesics, Oral pain medications.   PONV prophylaxis: Ondansetron (or other 5HT-3)     Comments:             MIMI Paula CRNA    I have reviewed the pertinent notes and labs in the chart from the past 30 days and (re)examined the patient.  Any updates or changes from " those notes are reflected in this note.

## 2024-05-06 ENCOUNTER — ANESTHESIA (OUTPATIENT)
Dept: GASTROENTEROLOGY | Facility: CLINIC | Age: 54
End: 2024-05-06
Payer: COMMERCIAL

## 2024-05-06 ENCOUNTER — HOSPITAL ENCOUNTER (OUTPATIENT)
Dept: MAMMOGRAPHY | Facility: CLINIC | Age: 54
Discharge: HOME | End: 2024-05-06
Attending: FAMILY MEDICINE
Payer: COMMERCIAL

## 2024-05-06 ENCOUNTER — HOSPITAL ENCOUNTER (OUTPATIENT)
Facility: CLINIC | Age: 54
Discharge: HOME | End: 2024-05-06
Attending: SURGERY | Admitting: SURGERY
Payer: COMMERCIAL

## 2024-05-06 VITALS
WEIGHT: 184 LBS | RESPIRATION RATE: 16 BRPM | HEIGHT: 65 IN | BODY MASS INDEX: 30.66 KG/M2 | DIASTOLIC BLOOD PRESSURE: 75 MMHG | HEART RATE: 66 BPM | SYSTOLIC BLOOD PRESSURE: 107 MMHG | TEMPERATURE: 98.2 F | OXYGEN SATURATION: 98 %

## 2024-05-06 DIAGNOSIS — Z12.31 VISIT FOR SCREENING MAMMOGRAM: ICD-10-CM

## 2024-05-06 LAB — COLONOSCOPY: NORMAL

## 2024-05-06 PROCEDURE — 258N000003 HC RX IP 258 OP 636: Performed by: PHYSICIAN ASSISTANT

## 2024-05-06 PROCEDURE — 250N000009 HC RX 250: Performed by: NURSE ANESTHETIST, CERTIFIED REGISTERED

## 2024-05-06 PROCEDURE — 250N000011 HC RX IP 250 OP 636: Performed by: NURSE ANESTHETIST, CERTIFIED REGISTERED

## 2024-05-06 PROCEDURE — 88305 TISSUE EXAM BY PATHOLOGIST: CPT | Mod: TC | Performed by: SURGERY

## 2024-05-06 PROCEDURE — 77067 SCR MAMMO BI INCL CAD: CPT

## 2024-05-06 PROCEDURE — 45385 COLONOSCOPY W/LESION REMOVAL: CPT | Performed by: SURGERY

## 2024-05-06 PROCEDURE — 370N000017 HC ANESTHESIA TECHNICAL FEE, PER MIN: Performed by: SURGERY

## 2024-05-06 RX ORDER — NALOXONE HYDROCHLORIDE 0.4 MG/ML
0.1 INJECTION, SOLUTION INTRAMUSCULAR; INTRAVENOUS; SUBCUTANEOUS
Status: DISCONTINUED | OUTPATIENT
Start: 2024-05-06 | End: 2024-05-06 | Stop reason: HOSPADM

## 2024-05-06 RX ORDER — DEXAMETHASONE SODIUM PHOSPHATE 4 MG/ML
4 INJECTION, SOLUTION INTRA-ARTICULAR; INTRALESIONAL; INTRAMUSCULAR; INTRAVENOUS; SOFT TISSUE
Status: DISCONTINUED | OUTPATIENT
Start: 2024-05-06 | End: 2024-05-06 | Stop reason: HOSPADM

## 2024-05-06 RX ORDER — FENTANYL CITRATE 50 UG/ML
25 INJECTION, SOLUTION INTRAMUSCULAR; INTRAVENOUS
Status: DISCONTINUED | OUTPATIENT
Start: 2024-05-06 | End: 2024-05-06 | Stop reason: HOSPADM

## 2024-05-06 RX ORDER — SODIUM CHLORIDE, SODIUM LACTATE, POTASSIUM CHLORIDE, CALCIUM CHLORIDE 600; 310; 30; 20 MG/100ML; MG/100ML; MG/100ML; MG/100ML
INJECTION, SOLUTION INTRAVENOUS CONTINUOUS
Status: DISCONTINUED | OUTPATIENT
Start: 2024-05-06 | End: 2024-05-06 | Stop reason: HOSPADM

## 2024-05-06 RX ORDER — FLUMAZENIL 0.1 MG/ML
0.2 INJECTION, SOLUTION INTRAVENOUS
Status: DISCONTINUED | OUTPATIENT
Start: 2024-05-06 | End: 2024-05-06 | Stop reason: HOSPADM

## 2024-05-06 RX ORDER — ONDANSETRON 4 MG/1
4 TABLET, ORALLY DISINTEGRATING ORAL EVERY 30 MIN PRN
Status: DISCONTINUED | OUTPATIENT
Start: 2024-05-06 | End: 2024-05-06 | Stop reason: HOSPADM

## 2024-05-06 RX ORDER — OXYCODONE HYDROCHLORIDE 5 MG/1
10 TABLET ORAL
Status: DISCONTINUED | OUTPATIENT
Start: 2024-05-06 | End: 2024-05-06 | Stop reason: HOSPADM

## 2024-05-06 RX ORDER — LIDOCAINE HYDROCHLORIDE 20 MG/ML
INJECTION, SOLUTION INFILTRATION; PERINEURAL PRN
Status: DISCONTINUED | OUTPATIENT
Start: 2024-05-06 | End: 2024-05-06

## 2024-05-06 RX ORDER — LIDOCAINE 40 MG/G
CREAM TOPICAL
Status: DISCONTINUED | OUTPATIENT
Start: 2024-05-06 | End: 2024-05-06 | Stop reason: HOSPADM

## 2024-05-06 RX ORDER — ONDANSETRON 2 MG/ML
4 INJECTION INTRAMUSCULAR; INTRAVENOUS EVERY 30 MIN PRN
Status: DISCONTINUED | OUTPATIENT
Start: 2024-05-06 | End: 2024-05-06 | Stop reason: HOSPADM

## 2024-05-06 RX ORDER — PROPOFOL 10 MG/ML
INJECTION, EMULSION INTRAVENOUS PRN
Status: DISCONTINUED | OUTPATIENT
Start: 2024-05-06 | End: 2024-05-06

## 2024-05-06 RX ORDER — OXYCODONE HYDROCHLORIDE 5 MG/1
5 TABLET ORAL
Status: DISCONTINUED | OUTPATIENT
Start: 2024-05-06 | End: 2024-05-06 | Stop reason: HOSPADM

## 2024-05-06 RX ORDER — PROPOFOL 10 MG/ML
INJECTION, EMULSION INTRAVENOUS CONTINUOUS PRN
Status: DISCONTINUED | OUTPATIENT
Start: 2024-05-06 | End: 2024-05-06

## 2024-05-06 RX ADMIN — PROPOFOL 100 MG: 10 INJECTION, EMULSION INTRAVENOUS at 10:40

## 2024-05-06 RX ADMIN — LIDOCAINE HYDROCHLORIDE 100 MG: 20 INJECTION, SOLUTION INFILTRATION; PERINEURAL at 10:40

## 2024-05-06 RX ADMIN — SODIUM CHLORIDE, POTASSIUM CHLORIDE, SODIUM LACTATE AND CALCIUM CHLORIDE: 600; 310; 30; 20 INJECTION, SOLUTION INTRAVENOUS at 09:47

## 2024-05-06 RX ADMIN — PROPOFOL 200 MCG/KG/MIN: 10 INJECTION, EMULSION INTRAVENOUS at 10:40

## 2024-05-06 ASSESSMENT — ACTIVITIES OF DAILY LIVING (ADL)
ADLS_ACUITY_SCORE: 35

## 2024-05-06 NOTE — H&P
"[facility]    Pre-Endoscopy History and Physical     Kayley Godoy MRN# [id number]   YOB: 1970 Age: 53 year old     Date of Procedure: 5/6/2024  Primary care provider: Steph Pedraza  Type of Endoscopy: Colonoscopy with possible biopsy, possible polypectomy  Reason for Procedure: Surveillance colonoscopy  Type of Anesthesia Anticipated: Conscious Sedation    HPI:    Kayley is a 53 year old female who will be undergoing the above procedure.      Last colonoscopy in 2019, tubular adenoma in cecum. Denies changes to stool, blood, weight loss. Denies family history of CRC. No blood thinners.    A history and physical has been performed. The patient's medications and allergies have been reviewed. The risks and benefits of the procedure and the sedation options and risks were discussed with the patient.  All questions were answered and informed consent was obtained.      She denies a personal or family history of anesthesia complications or bleeding disorders.     Patient Active Problem List   Diagnosis    ATYPICAL MELANOCYTIC NEVUS    BILATERAL KNEE PAIN    Decreased libido    Intramural leiomyoma of uterus    CARDIOVASCULAR SCREENING; LDL GOAL LESS THAN 160    ANAND III (cervical intraepithelial neoplasia grade III) with severe dysplasia    24 hour contact handout given    Dysthymia    Anxiety    Dyspepsia and other specified disorders of function of stomach    Dyspepsia    Tubular adenoma of colon    SARINA (generalized anxiety disorder)    Moderate episode of recurrent major depressive disorder (H)        Past Medical History:   Diagnosis Date    Abnormal Pap smear of cervix \"In her 20's\"    Had treatment with \"laser\".  Believes it to have been a CIN3 lesion.  Had repeat biopsies and all normal follow up    ASCUS favor benign 04/29/2016    Neg HPV    Tubular adenoma of colon         Past Surgical History:   Procedure Laterality Date    BIOPSY      [facility] SURGICAL PATHOLOGY  1993    " COLONOSCOPY N/A 07/31/2019    Procedure: Colonoscopy, With Polypectomy And Biopsy;  Surgeon: Dagoberto Ho DO;  Location: [facility]    GYN SURGERY      ORTHOPEDIC SURGERY Left 07/2023    left ACL    SURGICAL HISTORY OF -   2005    mole removal left upper chest    SURGICAL HISTORY OF -   2008    endometrial ablation    ZZC APPENDECTOMY  age 13       Social History     Tobacco Use    Smoking status: Former     Current packs/day: 0.50     Average packs/day: 0.5 packs/day for 15.0 years (7.5 ttl pk-yrs)     Types: Cigarettes    Smokeless tobacco: Never   Substance Use Topics    Alcohol use: Yes     Comment: rarely       Family History   Problem Relation Age of Onset    Cerebrovascular Disease Mother     Diabetes Father     C.A.D. Father         stents at age 70    Hypertension Father     Breast Cancer No family hx of     Cancer - colorectal No family hx of        Prior to Admission medications    Medication Sig Start Date End Date Taking? Authorizing Provider   estradiol (VAGIFEM) 10 MCG TABS vaginal tablet Place 1 tablet (10 mcg) vaginally twice a week 2/12/24  Yes Steph Pedraza DO   FLUoxetine (PROZAC) 40 MG capsule Take 1 capsule (40 mg) by mouth daily 4/23/24  Yes Steph Pedraza DO   IBUPROFEN 200 MG OR TABS Take 400 mg by mouth every 8 hours as needed    Yes Reported, Patient   MAGNESIUM PO Take by mouth daily as needed   Yes Reported, Patient   valACYclovir (VALTREX) 1000 mg tablet 2 tabs at sx onset; 2 more 12 hours later PRN cold sores 3/28/24  Yes Cherelle Bell PA-C   zolpidem (AMBIEN) 5 MG tablet Take 1 tablet (5 mg) by mouth every evening as needed for sleep 4/25/24  Yes Steph Pedraza DO   EPINEPHrine (ANY BX GENERIC EQUIV) 0.3 MG/0.3ML injection 2-pack Inject 0.3 mLs (0.3 mg) into the muscle as needed for anaphylaxis May repeat one time in 5-15 minutes if response to initial dose is inadequate. 5/12/23   Moo Reed MD       Allergies   Allergen Reactions    Wellbutrin  "[Bupropion Hydrobromide] Itching        REVIEW OF SYSTEMS:   5 point ROS negative except as noted above in HPI, including Gen., Resp., CV, GI &  system review.    PHYSICAL EXAM:   /78   Pulse 71   Temp 98.2  F (36.8  C) (Oral)   Resp 16   Ht 1.659 m (5' 5.32\")   Wt 83.5 kg (184 lb)   SpO2 96%   BMI 30.32 kg/m   Estimated body mass index is 30.32 kg/m  as calculated from the following:    Height as of this encounter: 1.659 m (5' 5.32\").    Weight as of this encounter: 83.5 kg (184 lb).   Constitutional: Awake, alert, no acute distress.  Eyes: No scleral icterus.  Conjunctiva are without injection.  ENMT: Mucous membranes moist, dentition and gums are intact.   Neck: Soft, supple, trachea midline.    Endocrine: n/a   Lymphatic: There is no cervical, submandibularadenopathy.  Respiratory: normal effortgs   Cardiovascular: S1, S2  Abdomen: Non-distended, non-tender,  No masses,  Musculoskeletal: No spinal or CVA tenderness. Full range of motion in the upper and lower extremities.    Skin: No skin rashes or lesions to inspection.  No petechia.    Neurologic: alerted and oriented 3x  Psychiatric: The patient's affect is not blunted and mood is appropriate.  DIAGNOSTICS:    Not indicated    IMPRESSION   ASA Class 2 - Mild systemic disease    PLAN:   Plan for Colonoscopy with possible biopsy, possible polypectomy. We discussed the risks, benefits and alternatives and the patient wished to proceed.  Patient is cleared for the above procedure.    The above has been forwarded to the consulting provider.    Jose Mnauel Rossi, DO PGY2  [facility]        "

## 2024-05-06 NOTE — LETTER
Kayely Godoy  [address] [place] [id number]    May 13, 2024    Dear Kayley,  This letter is written to inform you of the results of your recent colonoscopy.  Your examination showed polyp(s) in your transverse colon and rectum. All polyps were removed in their entirety and sent for review by a pathologist. As you will see on the pathology report below, the tissue(s) were tubular adenomatous polyps and hyperplastic polyps. Your examination was otherwise without abnormality.    Final Diagnosis   A: Large intestine, transverse, polypectomy:  -Serrated adenoma, no evidence of cytologic dysplasia or malignancy     B: Large intestine, rectum, polypectomy:  -Hyperplastic polyp     Adenomatous polyps are entirely benign (non-cancerous); however, patients who have developed these polyps are at an increased risk for developing additional polyps in the future. If these are not eventually removed, there is a risk of developing colon cancer. We will advise more frequent examinations with you because of the risk associated with this type of polyp.    Hyperplastic polyps are entirely benign (non-cancerous) and rarely associated with the development of additional polyps or colorectal cancer.    Given these findings, your personal history of tubular adenomas, I recommend that you undergo a repeat colonoscopy in 5 year(s) for surveillance. We will enter you into a recall system so you receive a reminder closer to the time that you are due for repeat examination.     Please remember that this recommendation is made with the understanding that you are not experiencing persistent changes in bowel function, bleeding per rectum, and/or significant abdominal pain. If you experience these symptoms, please contact your primary care provider for a further evaluation.     If you have any questions or concerns about the results of your colonoscopy or the appropriate follow-up, please contact my assistant at  [phone].    Sincerely,        [facility] HealthDO wang FACOS [facility]  ___

## 2024-05-06 NOTE — ANESTHESIA POSTPROCEDURE EVALUATION
Patient: Kayley Godoy    Procedure: Procedure(s):  COLONOSCOPY, FLEXIBLE, WITH LESION REMOVAL USING SNARE       Anesthesia Type:  General    Note:  Disposition: Outpatient   Postop Pain Control: Uneventful            Sign Out: Well controlled pain   PONV: No   Neuro/Psych: Uneventful            Sign Out: Acceptable/Baseline neuro status   Airway/Respiratory: Uneventful            Sign Out: Acceptable/Baseline resp. status   CV/Hemodynamics: Uneventful            Sign Out: Acceptable CV status; No obvious hypovolemia; No obvious fluid overload   Other NRE: NONE   DID A NON-ROUTINE EVENT OCCUR? No       Last vitals:  Vitals Value Taken Time   BP 92/74 05/06/24 1110   Temp     Pulse 66 05/06/24 1110   Resp     SpO2 97 % 05/06/24 1113   Vitals shown include unfiled device data.    Electronically Signed By: MIMI Monroy CRNA  May 6, 2024  11:14 AM

## 2024-05-06 NOTE — ANESTHESIA CARE TRANSFER NOTE
Patient: Kayley Godoy    Procedure: Procedure(s):  COLONOSCOPY, FLEXIBLE, WITH LESION REMOVAL USING SNARE       Diagnosis: Special screening for malignant neoplasm of colon [Z12.11]  Diagnosis Additional Information: No value filed.    Anesthesia Type:   General     Note:    Oropharynx: oropharynx clear of all foreign objects  Level of Consciousness: drowsy  Oxygen Supplementation: room air        Vital Signs Stable: post-procedure vital signs reviewed and stable  Report to RN Given: handoff report given  Patient transferred to: Phase II    Handoff Report: Identifed the Patient, Identified the Reponsible Provider, Reviewed the pertinent medical history, Discussed the surgical course, Reviewed Intra-OP anesthesia mangement and issues during anesthesia, Set expectations for post-procedure period and Allowed opportunity for questions and acknowledgement of understanding  Vitals:  Vitals Value Taken Time   BP     Temp     Pulse     Resp     SpO2         Electronically Signed By: MIMI Monroy CRNA  May 6, 2024  11:07 AM

## 2024-05-08 LAB
PATH REPORT.COMMENTS IMP SPEC: NORMAL
PATH REPORT.COMMENTS IMP SPEC: NORMAL
PATH REPORT.FINAL DX SPEC: NORMAL
PATH REPORT.GROSS SPEC: NORMAL
PATH REPORT.MICROSCOPIC SPEC OTHER STN: NORMAL
PATH REPORT.RELEVANT HX SPEC: NORMAL
PHOTO IMAGE: NORMAL

## 2024-05-08 PROCEDURE — 88305 TISSUE EXAM BY PATHOLOGIST: CPT | Mod: 26 | Performed by: PATHOLOGY

## 2024-05-20 ENCOUNTER — MYC REFILL (OUTPATIENT)
Dept: FAMILY MEDICINE | Facility: CLINIC | Age: 54
End: 2024-05-20
Payer: COMMERCIAL

## 2024-05-20 DIAGNOSIS — G47.00 INSOMNIA, UNSPECIFIED TYPE: ICD-10-CM

## 2024-05-21 RX ORDER — ZOLPIDEM TARTRATE 5 MG/1
5 TABLET ORAL
Qty: 10 TABLET | Refills: 0 | Status: SHIPPED | OUTPATIENT
Start: 2024-05-21 | End: 2024-06-16

## 2024-06-16 ENCOUNTER — MYC REFILL (OUTPATIENT)
Dept: FAMILY MEDICINE | Facility: CLINIC | Age: 54
End: 2024-06-16
Payer: COMMERCIAL

## 2024-06-16 DIAGNOSIS — G47.00 INSOMNIA, UNSPECIFIED TYPE: ICD-10-CM

## 2024-06-17 RX ORDER — ZOLPIDEM TARTRATE 5 MG/1
5 TABLET ORAL
Qty: 10 TABLET | Refills: 0 | Status: SHIPPED | OUTPATIENT
Start: 2024-06-17 | End: 2024-07-02

## 2024-07-02 ENCOUNTER — MYC MEDICAL ADVICE (OUTPATIENT)
Dept: FAMILY MEDICINE | Facility: CLINIC | Age: 54
End: 2024-07-02
Payer: COMMERCIAL

## 2024-07-02 ENCOUNTER — MYC REFILL (OUTPATIENT)
Dept: FAMILY MEDICINE | Facility: CLINIC | Age: 54
End: 2024-07-02
Payer: COMMERCIAL

## 2024-07-02 DIAGNOSIS — G47.00 INSOMNIA, UNSPECIFIED TYPE: ICD-10-CM

## 2024-07-02 DIAGNOSIS — R41.3 MEMORY CHANGE: Primary | ICD-10-CM

## 2024-07-03 NOTE — TELEPHONE ENCOUNTER
Pending Prescriptions:                       Disp   Refills    zolpidem (AMBIEN) 5 MG tablet              10 tab*0        Sig: Take 1 tablet (5 mg) by mouth every evening as needed           for sleep    Routing refill request to provider for review/approval because:  Drug not on the [facility] refill protocol     Stephanie Hernandez RN  [facility]

## 2024-07-05 RX ORDER — ZOLPIDEM TARTRATE 5 MG/1
5 TABLET ORAL
Qty: 10 TABLET | Refills: 0 | Status: SHIPPED | OUTPATIENT
Start: 2024-07-05 | End: 2024-07-30

## 2024-07-30 ENCOUNTER — MYC REFILL (OUTPATIENT)
Dept: FAMILY MEDICINE | Facility: CLINIC | Age: 54
End: 2024-07-30
Payer: COMMERCIAL

## 2024-07-30 DIAGNOSIS — G47.00 INSOMNIA, UNSPECIFIED TYPE: ICD-10-CM

## 2024-07-30 RX ORDER — ZOLPIDEM TARTRATE 5 MG/1
5 TABLET ORAL
Qty: 10 TABLET | Refills: 0 | Status: SHIPPED | OUTPATIENT
Start: 2024-07-30 | End: 2024-08-22

## 2024-08-07 ENCOUNTER — MYC REFILL (OUTPATIENT)
Dept: FAMILY MEDICINE | Facility: CLINIC | Age: 54
End: 2024-08-07
Payer: COMMERCIAL

## 2024-08-07 DIAGNOSIS — F41.1 GAD (GENERALIZED ANXIETY DISORDER): ICD-10-CM

## 2024-08-07 DIAGNOSIS — F33.1 MODERATE EPISODE OF RECURRENT MAJOR DEPRESSIVE DISORDER (H): ICD-10-CM

## 2024-08-08 RX ORDER — FLUOXETINE 40 MG/1
40 CAPSULE ORAL DAILY
Qty: 90 CAPSULE | Refills: 0 | Status: SHIPPED | OUTPATIENT
Start: 2024-08-08 | End: 2024-09-24

## 2024-08-13 ENCOUNTER — MYC REFILL (OUTPATIENT)
Dept: FAMILY MEDICINE | Facility: CLINIC | Age: 54
End: 2024-08-13

## 2024-08-13 ENCOUNTER — VIRTUAL VISIT (OUTPATIENT)
Dept: FAMILY MEDICINE | Facility: CLINIC | Age: 54
End: 2024-08-13
Payer: COMMERCIAL

## 2024-08-13 DIAGNOSIS — G47.00 INSOMNIA, UNSPECIFIED TYPE: ICD-10-CM

## 2024-08-13 DIAGNOSIS — F41.1 GAD (GENERALIZED ANXIETY DISORDER): Primary | ICD-10-CM

## 2024-08-13 DIAGNOSIS — F33.1 MODERATE EPISODE OF RECURRENT MAJOR DEPRESSIVE DISORDER (H): ICD-10-CM

## 2024-08-13 PROCEDURE — 99213 OFFICE O/P EST LOW 20 MIN: CPT | Mod: 95 | Performed by: FAMILY MEDICINE

## 2024-08-13 RX ORDER — ZOLPIDEM TARTRATE 5 MG/1
5 TABLET ORAL
Qty: 10 TABLET | Refills: 0 | OUTPATIENT
Start: 2024-08-13

## 2024-08-13 ASSESSMENT — ANXIETY QUESTIONNAIRES
3. WORRYING TOO MUCH ABOUT DIFFERENT THINGS: NOT AT ALL
2. NOT BEING ABLE TO STOP OR CONTROL WORRYING: NOT AT ALL
GAD7 TOTAL SCORE: 2
1. FEELING NERVOUS, ANXIOUS, OR ON EDGE: SEVERAL DAYS
7. FEELING AFRAID AS IF SOMETHING AWFUL MIGHT HAPPEN: SEVERAL DAYS
6. BECOMING EASILY ANNOYED OR IRRITABLE: NOT AT ALL
IF YOU CHECKED OFF ANY PROBLEMS ON THIS QUESTIONNAIRE, HOW DIFFICULT HAVE THESE PROBLEMS MADE IT FOR YOU TO DO YOUR WORK, TAKE CARE OF THINGS AT HOME, OR GET ALONG WITH OTHER PEOPLE: SOMEWHAT DIFFICULT
5. BEING SO RESTLESS THAT IT IS HARD TO SIT STILL: NOT AT ALL
GAD7 TOTAL SCORE: 2
7. FEELING AFRAID AS IF SOMETHING AWFUL MIGHT HAPPEN: SEVERAL DAYS
8. IF YOU CHECKED OFF ANY PROBLEMS, HOW DIFFICULT HAVE THESE MADE IT FOR YOU TO DO YOUR WORK, TAKE CARE OF THINGS AT HOME, OR GET ALONG WITH OTHER PEOPLE?: SOMEWHAT DIFFICULT
GAD7 TOTAL SCORE: 2
4. TROUBLE RELAXING: NOT AT ALL

## 2024-08-13 NOTE — PROGRESS NOTES
"Kayley is a 53 year old who is being evaluated via a billable video visit.    How would you like to obtain your AVS? MyChart  If the video visit is dropped, the invitation should be resent by: Text to cell phone: [phone]  Will anyone else be joining your video visit? No      A/p:      ICD-10-CM    1. SARINA (generalized anxiety disorder)  F41.1 FLUoxetine (PROZAC) 20 MG capsule      2. Moderate episode of recurrent major depressive disorder (H)  F33.1 FLUoxetine (PROZAC) 20 MG capsule        Reviewed that is is difficult to know where this symptoms of decreased interest/motivation is coming from.      Flattening of emotion is a known side effect of selective serotonin reuptake inhibitor medications so that is possible.  Worsening depression is also possible as the lack of motivation/interest is pretty typical of depression.  Hormones can play a role but I would not recommend starting with hormone as I think it is less likely to help her symptoms of concern.    Decided to increase fluoxetine to 60mg daily.  Follow-up 6 weeks.  If better, will continue.  If not, consider alternate medication vs hormone management    Pt verbalize understanding.    Subjective   Kayley is a 53 year old, presenting for the following health issues:  Recheck Medication        8/13/2024     2:43 PM   Additional Questions   Roomed by Kayley MEEHAN   Accompanied by Self      HPI     Depression and Anxiety   How are you doing with your depression since your last visit? No change/stable  How are you doing with your anxiety since your last visit?  No change/stable   Are you having other symptoms that might be associated with depression or anxiety? No  Have you had a significant life event? No   Do you have any concerns with your use of alcohol or other drugs? No    Concerned about medication side effects - brain fog         Ended up scheduling memory testing in Feb (neuropsych).    Feels like she likes the fluoxetine as she does not \"fly off the handle\" or " "get as worried but feels like she gets \"couch trapped' or tunes out.  Takes effort to tune in to exciting events in her children's lives.  Just overall feels a lack of interest and motivation.  Not sure how long this has been, did feel like she felt better when the fluoxetine dose was increased form 20mg to 40mg.      Did therapy after her mom passed away and felt that went really well.  Feels good there.        Talked to girlfriends who recommend considering \"hormone balancing\".  Not using the vagifem, ended up sing a vaginal moisturizer which has been working well.      Social History     Tobacco Use    Smoking status: Former     Current packs/day: 0.50     Average packs/day: 0.5 packs/day for 15.0 years (7.5 ttl pk-yrs)     Types: Cigarettes    Smokeless tobacco: Never   Substance Use Topics    Alcohol use: Yes     Comment: rarely    Drug use: No         6/29/2023     2:43 PM 10/11/2023     9:41 AM 2/9/2024    11:39 AM   PHQ   PHQ-9 Total Score 3 16 5   Q9: Thoughts of better off dead/self-harm past 2 weeks Not at all Several days Not at all   F/U: Thoughts of suicide or self-harm  Yes    F/U: Self harm-plan  No    F/U: Self-harm action  No    F/U: Safety concerns  No          4/9/2023     7:49 PM 4/10/2023     3:55 PM 10/11/2023     9:41 AM   SARINA-7 SCORE   Total Score 1 (minimal anxiety) 2 (minimal anxiety) 4 (minimal anxiety)   Total Score 1 2 4         Suicide Assessment Five-step Evaluation and Treatment (SAFE-T)                Objective           Vitals:  No vitals were obtained today due to virtual visit.    Physical Exam   GENERAL: alert and no distress  EYES: Eyes grossly normal to inspection.  No discharge or erythema, or obvious scleral/conjunctival abnormalities.  RESP: No audible wheeze, cough, or visible cyanosis.    SKIN: Visible skin clear. No significant rash, abnormal pigmentation or lesions.  NEURO: Cranial nerves grossly intact.  Mentation and speech appropriate for age.  PSYCH: Appropriate " affect, tone, and pace of words    Epic reviewed      Video-Visit Details    Type of service:  Video Visit   Originating Location (pt. Location): Home    Distant Location (provider location):  On-site  Platform used for Video Visit: Lauren  Signed Electronically by: Steph Pedraza DO

## 2024-08-22 ENCOUNTER — MYC REFILL (OUTPATIENT)
Dept: FAMILY MEDICINE | Facility: CLINIC | Age: 54
End: 2024-08-22
Payer: COMMERCIAL

## 2024-08-22 DIAGNOSIS — G47.00 INSOMNIA, UNSPECIFIED TYPE: ICD-10-CM

## 2024-08-23 RX ORDER — ZOLPIDEM TARTRATE 5 MG/1
5 TABLET ORAL
Qty: 10 TABLET | Refills: 0 | Status: SHIPPED | OUTPATIENT
Start: 2024-08-23 | End: 2024-09-19

## 2024-08-23 NOTE — TELEPHONE ENCOUNTER
Pending Prescriptions:                       Disp   Refills    zolpidem (AMBIEN) 5 MG tablet              10 tab*0        Sig: Take 1 tablet (5 mg) by mouth every evening as needed           for sleep.    Routing refill request to provider for review/approval because:  Drug not on the [facility] refill protocol     Stephanie Hernandez RN  [facility]

## 2024-09-19 ENCOUNTER — MYC REFILL (OUTPATIENT)
Dept: FAMILY MEDICINE | Facility: CLINIC | Age: 54
End: 2024-09-19
Payer: COMMERCIAL

## 2024-09-19 DIAGNOSIS — G47.00 INSOMNIA, UNSPECIFIED TYPE: ICD-10-CM

## 2024-09-20 RX ORDER — ZOLPIDEM TARTRATE 5 MG/1
5 TABLET ORAL
Qty: 10 TABLET | Refills: 0 | Status: SHIPPED | OUTPATIENT
Start: 2024-09-20

## 2024-09-23 ASSESSMENT — ANXIETY QUESTIONNAIRES
GAD7 TOTAL SCORE: 11
2. NOT BEING ABLE TO STOP OR CONTROL WORRYING: MORE THAN HALF THE DAYS
6. BECOMING EASILY ANNOYED OR IRRITABLE: SEVERAL DAYS
GAD7 TOTAL SCORE: 11
7. FEELING AFRAID AS IF SOMETHING AWFUL MIGHT HAPPEN: SEVERAL DAYS
3. WORRYING TOO MUCH ABOUT DIFFERENT THINGS: MORE THAN HALF THE DAYS
5. BEING SO RESTLESS THAT IT IS HARD TO SIT STILL: SEVERAL DAYS
IF YOU CHECKED OFF ANY PROBLEMS ON THIS QUESTIONNAIRE, HOW DIFFICULT HAVE THESE PROBLEMS MADE IT FOR YOU TO DO YOUR WORK, TAKE CARE OF THINGS AT HOME, OR GET ALONG WITH OTHER PEOPLE: SOMEWHAT DIFFICULT
7. FEELING AFRAID AS IF SOMETHING AWFUL MIGHT HAPPEN: SEVERAL DAYS
GAD7 TOTAL SCORE: 11
4. TROUBLE RELAXING: MORE THAN HALF THE DAYS
8. IF YOU CHECKED OFF ANY PROBLEMS, HOW DIFFICULT HAVE THESE MADE IT FOR YOU TO DO YOUR WORK, TAKE CARE OF THINGS AT HOME, OR GET ALONG WITH OTHER PEOPLE?: SOMEWHAT DIFFICULT
1. FEELING NERVOUS, ANXIOUS, OR ON EDGE: MORE THAN HALF THE DAYS

## 2024-09-24 ENCOUNTER — VIRTUAL VISIT (OUTPATIENT)
Dept: FAMILY MEDICINE | Facility: CLINIC | Age: 54
End: 2024-09-24
Payer: COMMERCIAL

## 2024-09-24 DIAGNOSIS — N95.1 SYMPTOMATIC MENOPAUSAL OR FEMALE CLIMACTERIC STATES: ICD-10-CM

## 2024-09-24 DIAGNOSIS — F41.1 GAD (GENERALIZED ANXIETY DISORDER): Primary | ICD-10-CM

## 2024-09-24 DIAGNOSIS — F33.1 MODERATE EPISODE OF RECURRENT MAJOR DEPRESSIVE DISORDER (H): ICD-10-CM

## 2024-09-24 PROCEDURE — 99214 OFFICE O/P EST MOD 30 MIN: CPT | Mod: 95 | Performed by: FAMILY MEDICINE

## 2024-09-24 RX ORDER — ESTRADIOL 0.03 MG/D
1 FILM, EXTENDED RELEASE TRANSDERMAL
Qty: 24 PATCH | Refills: 0 | Status: SHIPPED | OUTPATIENT
Start: 2024-09-26

## 2024-09-24 RX ORDER — SODIUM FLUORIDE 6 MG/ML
PASTE, DENTIFRICE DENTAL
COMMUNITY
Start: 2024-09-10

## 2024-09-24 RX ORDER — PROGESTERONE 100 MG/1
100 CAPSULE ORAL DAILY
Qty: 90 CAPSULE | Refills: 0 | Status: SHIPPED | OUTPATIENT
Start: 2024-09-24

## 2024-09-24 RX ORDER — FLUOXETINE 40 MG/1
40 CAPSULE ORAL DAILY
Qty: 90 CAPSULE | Refills: 1 | Status: SHIPPED | OUTPATIENT
Start: 2024-09-24

## 2024-09-24 NOTE — PROGRESS NOTES
"Kayley is a 53 year old who is being evaluated via a billable video visit.    How would you like to obtain your AVS? MyChart  If the video visit is dropped, the invitation should be resent by: Text to cell phone: [phone]  Will anyone else be joining your video visit? No      A/P:      ICD-10-CM    1. SARINA (generalized anxiety disorder)  F41.1 FLUoxetine (PROZAC) 40 MG capsule     FLUoxetine (PROZAC) 20 MG capsule      2. Moderate episode of recurrent major depressive disorder (H)  F33.1 FLUoxetine (PROZAC) 40 MG capsule     FLUoxetine (PROZAC) 20 MG capsule      3. Symptomatic menopausal or female climacteric states  N95.1 estradiol (VIVELLE-DOT) 0.025 MG/24HR bi-weekly patch     progesterone (PROMETRIUM) 100 MG capsule        At last visit fluoxetine was increased as pt was feeling unmotivated, lack of interest/lack of enjoyment.  These symptoms have improved.  Pt was also concerned about lack of focus/\"brain fog\" and was concerned that could be related to menopause.    Those symptoms did not improved with increased fluoxetine and pt is interested in trial of HRT to see if those symptoms improve.  Pt without contraindication (no breast cancer, h/o thrombosis)  reviewed estrogen/progesterone for endometrial protection.  Side effects and risks discussed.  Follow-up 4-8 weeks for effect.     Subjective   Kayley is a 53 year old, presenting for the following health issues:  Depression    History of Present Illness       Mental Health Follow-up:  Patient presents to follow-up on Depression & Anxiety.Patient's depression since last visit has been:  Medium  The patient is not having other symptoms associated with depression.  Patient's anxiety since last visit has been:  Worse  The patient is not having other symptoms associated with anxiety.  Any significant life events: No  Patient is feeling anxious or having panic attacks.  Patient has no concerns about alcohol or drug use.    She eats 0-1 servings of fruits and " "vegetables daily.She consumes 2 sweetened beverage(s) daily.She exercises with enough effort to increase her heart rate 9 or less minutes per day.  She exercises with enough effort to increase her heart rate 3 or less days per week.   She is taking medications regularly.     Feels like things are \"status quo\".  Bothered most right now by feeling \"spacey\".  Feels like she is having trouble \"being present\" in the conversations.  Feels like \"memory\" and \"fogginess\" are the same or worse.    Does feel better about participating in things, being motivated.  This has improved.  Finds more interest in activities, enjoying things as she much as she would expect.     Worries about her memory but feels confident at work, does not feel it is a problem there.  Does have neuropsych testing scheduled.        Review of Systems  Constitutional, HEENT, cardiovascular, pulmonary, gi and gu systems are negative, except as otherwise noted.      Objective           Vitals:  No vitals were obtained today due to virtual visit.    Physical Exam   GENERAL: alert and no distress  EYES: Eyes grossly normal to inspection.  No discharge or erythema, or obvious scleral/conjunctival abnormalities.  RESP: No audible wheeze, cough, or visible cyanosis.    SKIN: Visible skin clear. No significant rash, abnormal pigmentation or lesions.  NEURO: Cranial nerves grossly intact.  Mentation and speech appropriate for age.  PSYCH: Appropriate affect, tone, and pace of words    EPic reviewed      Video-Visit Details    Type of service:  Video Visit   Originating Location (pt. Location): Other work    Distant Location (provider location):  On-site  Platform used for Video Visit: Lauren  Signed Electronically by: Steph Pedraza DO    "

## 2024-10-01 DIAGNOSIS — N95.1 SYMPTOMATIC MENOPAUSAL OR FEMALE CLIMACTERIC STATES: ICD-10-CM

## 2024-10-03 RX ORDER — ESTRADIOL 0.03 MG/D
FILM, EXTENDED RELEASE TRANSDERMAL
Qty: 24 PATCH | Refills: 0 | OUTPATIENT
Start: 2024-10-03

## 2024-10-09 ENCOUNTER — MYC REFILL (OUTPATIENT)
Dept: FAMILY MEDICINE | Facility: CLINIC | Age: 54
End: 2024-10-09
Payer: COMMERCIAL

## 2024-10-09 DIAGNOSIS — G47.00 INSOMNIA, UNSPECIFIED TYPE: ICD-10-CM

## 2024-10-10 RX ORDER — ZOLPIDEM TARTRATE 5 MG/1
5 TABLET ORAL
Qty: 10 TABLET | Refills: 0 | Status: SHIPPED | OUTPATIENT
Start: 2024-10-10 | End: 2024-11-01

## 2024-10-10 NOTE — TELEPHONE ENCOUNTER
Pending Prescriptions:                       Disp   Refills    zolpidem (AMBIEN) 5 MG tablet             10 tab*0            Sig: Take 1 tablet (5 mg) by mouth every evening as           needed for sleep.    Routing refill request to provider for review/approval because:  Drug not on the [facility] refill protocol       Krishna Stephen RN

## 2024-11-01 ENCOUNTER — MYC REFILL (OUTPATIENT)
Dept: FAMILY MEDICINE | Facility: CLINIC | Age: 54
End: 2024-11-01
Payer: COMMERCIAL

## 2024-11-01 DIAGNOSIS — G47.00 INSOMNIA, UNSPECIFIED TYPE: ICD-10-CM

## 2024-11-04 RX ORDER — ZOLPIDEM TARTRATE 5 MG/1
5 TABLET ORAL
Qty: 10 TABLET | Refills: 0 | Status: SHIPPED | OUTPATIENT
Start: 2024-11-04

## 2024-12-11 ENCOUNTER — MYC REFILL (OUTPATIENT)
Dept: FAMILY MEDICINE | Facility: CLINIC | Age: 54
End: 2024-12-11
Payer: COMMERCIAL

## 2024-12-11 DIAGNOSIS — G47.00 INSOMNIA, UNSPECIFIED TYPE: ICD-10-CM

## 2024-12-12 RX ORDER — ZOLPIDEM TARTRATE 5 MG/1
5 TABLET ORAL
Qty: 10 TABLET | Refills: 0 | Status: SHIPPED | OUTPATIENT
Start: 2024-12-12

## 2024-12-16 ENCOUNTER — E-VISIT (OUTPATIENT)
Dept: FAMILY MEDICINE | Facility: CLINIC | Age: 54
End: 2024-12-16
Payer: COMMERCIAL

## 2024-12-16 DIAGNOSIS — F41.1 GAD (GENERALIZED ANXIETY DISORDER): Primary | ICD-10-CM

## 2024-12-16 PROCEDURE — 99207 PR NON-BILLABLE SERV PER CHARTING: CPT | Performed by: FAMILY MEDICINE

## 2024-12-16 ASSESSMENT — ANXIETY QUESTIONNAIRES
1. FEELING NERVOUS, ANXIOUS, OR ON EDGE: NEARLY EVERY DAY
4. TROUBLE RELAXING: MORE THAN HALF THE DAYS
GAD7 TOTAL SCORE: 7
IF YOU CHECKED OFF ANY PROBLEMS ON THIS QUESTIONNAIRE, HOW DIFFICULT HAVE THESE PROBLEMS MADE IT FOR YOU TO DO YOUR WORK, TAKE CARE OF THINGS AT HOME, OR GET ALONG WITH OTHER PEOPLE: SOMEWHAT DIFFICULT
GAD7 TOTAL SCORE: 7
5. BEING SO RESTLESS THAT IT IS HARD TO SIT STILL: NOT AT ALL
8. IF YOU CHECKED OFF ANY PROBLEMS, HOW DIFFICULT HAVE THESE MADE IT FOR YOU TO DO YOUR WORK, TAKE CARE OF THINGS AT HOME, OR GET ALONG WITH OTHER PEOPLE?: SOMEWHAT DIFFICULT
7. FEELING AFRAID AS IF SOMETHING AWFUL MIGHT HAPPEN: NOT AT ALL
GAD7 TOTAL SCORE: 7
2. NOT BEING ABLE TO STOP OR CONTROL WORRYING: SEVERAL DAYS
6. BECOMING EASILY ANNOYED OR IRRITABLE: NOT AT ALL
7. FEELING AFRAID AS IF SOMETHING AWFUL MIGHT HAPPEN: NOT AT ALL
3. WORRYING TOO MUCH ABOUT DIFFERENT THINGS: SEVERAL DAYS

## 2024-12-16 ASSESSMENT — PATIENT HEALTH QUESTIONNAIRE - PHQ9
SUM OF ALL RESPONSES TO PHQ QUESTIONS 1-9: 15
SUM OF ALL RESPONSES TO PHQ QUESTIONS 1-9: 15
10. IF YOU CHECKED OFF ANY PROBLEMS, HOW DIFFICULT HAVE THESE PROBLEMS MADE IT FOR YOU TO DO YOUR WORK, TAKE CARE OF THINGS AT HOME, OR GET ALONG WITH OTHER PEOPLE: SOMEWHAT DIFFICULT

## 2024-12-17 ASSESSMENT — PATIENT HEALTH QUESTIONNAIRE - PHQ9: SUM OF ALL RESPONSES TO PHQ QUESTIONS 1-9: 15

## 2024-12-17 NOTE — PATIENT INSTRUCTIONS
Thank you for choosing us for your care. I think an in-clinic or virtual visit would be the best next step based on your symptoms. Please schedule a clinic appointment; you won t be charged for this eVisit.      You can schedule an appointment by clicking here in [facility], or call [phone].

## 2024-12-26 DIAGNOSIS — N95.1 SYMPTOMATIC MENOPAUSAL OR FEMALE CLIMACTERIC STATES: ICD-10-CM

## 2024-12-26 RX ORDER — PROGESTERONE 100 MG/1
100 CAPSULE ORAL DAILY
Qty: 90 CAPSULE | Refills: 2 | Status: SHIPPED | OUTPATIENT
Start: 2024-12-26

## 2025-01-20 ENCOUNTER — MYC REFILL (OUTPATIENT)
Dept: FAMILY MEDICINE | Facility: CLINIC | Age: 55
End: 2025-01-20
Payer: COMMERCIAL

## 2025-01-20 DIAGNOSIS — G47.00 INSOMNIA, UNSPECIFIED TYPE: ICD-10-CM

## 2025-01-22 RX ORDER — ZOLPIDEM TARTRATE 5 MG/1
5 TABLET ORAL
Qty: 10 TABLET | Refills: 0 | Status: SHIPPED | OUTPATIENT
Start: 2025-01-22

## 2025-02-04 ENCOUNTER — MYC REFILL (OUTPATIENT)
Dept: FAMILY MEDICINE | Facility: CLINIC | Age: 55
End: 2025-02-04
Payer: COMMERCIAL

## 2025-02-04 DIAGNOSIS — F41.1 GAD (GENERALIZED ANXIETY DISORDER): ICD-10-CM

## 2025-02-04 DIAGNOSIS — F33.1 MODERATE EPISODE OF RECURRENT MAJOR DEPRESSIVE DISORDER (H): ICD-10-CM

## 2025-02-04 RX ORDER — FLUOXETINE HYDROCHLORIDE 40 MG/1
40 CAPSULE ORAL DAILY
Qty: 30 CAPSULE | Refills: 0 | OUTPATIENT
Start: 2025-02-04

## 2025-02-11 ENCOUNTER — OFFICE VISIT (OUTPATIENT)
Dept: NEUROLOGY | Facility: CLINIC | Age: 55
End: 2025-02-11
Attending: FAMILY MEDICINE
Payer: COMMERCIAL

## 2025-02-11 DIAGNOSIS — F06.8 OTHER SPECIFIED MENTAL DISORDERS DUE TO KNOWN PHYSIOLOGICAL CONDITION: Primary | ICD-10-CM

## 2025-02-11 DIAGNOSIS — R41.3 MEMORY CHANGE: ICD-10-CM

## 2025-02-11 DIAGNOSIS — F41.9 ANXIETY: ICD-10-CM

## 2025-02-11 DIAGNOSIS — F33.1 MAJOR DEPRESSIVE DISORDER, RECURRENT EPISODE, MODERATE (H): ICD-10-CM

## 2025-02-11 NOTE — PROGRESS NOTES
Patient was seen for neuropsychological evaluation at the request of Dr. Steph Pedraza, for the purposes of diagnostic clarification and treatment planning.  1 hrs 36 min of test administration and scoring were provided by this writer, Hawa Betancourt.  Please see Dr. Raymundo Garcia's report for a full interpretation of the findings.

## 2025-02-11 NOTE — PROGRESS NOTES
Name: Kayley Godoy MRN: [id number]  : 1970  LEYVA: 2025  Staff: MARIA EUGENIA Tech: HH Age: 54  Sex: Female Hand: Right Educ: 18  Vision: 20/25 ?with correction / []without correction    ORIENTATION     Time  -0     Place       Personal info         WAIS-IV     Raw SSa     Similarities  24 9     Block Design  41 11     Digit Span  41 18 RDS= 17     Coding  81 14    HVLT-R Form 1     Trial 1 2 3 Total      7 11 11  29    Raw T     Total Learning (1-3) 29 54     Delayed Recall  10 50     Percent Retention 91 48     Recognition Hits/FP 12/0 58    BVMT-R Form 1     Trial 1 2 3 Total      11 12 12  35    Raw T / %ile     Total Learning (1-3) 35 72     Delayed Recall  12 66     Percent Retention 100 >16th     Recognition Hits/FP 6/0 >16th    IRVING-O    Raw  T %ile     Copy    32.0     >16     Time to Copy  119     >16    WRAT5   SS %ile Grade Equiv.     Word Reading  105 63 >12.0    COWAT (FAS)    Raw: 50   T: 54    Animals   Raw:  22  T-score:  47    BOSTON NAMING TEST   Raw: 59   %ile 85-95    COMPLEX IDEATIONAL MATERIAL   Raw:  T: 51    TRAILS  Raw  Err %ile    A 15  0 100   B 48  0 93-97      STROOP Raw %ile   Word 108 76-86   Color  74 50-64       Color/Word  34 19-26    VIDA Short   Raw: 12/15 %ile: 56-67    GROOVED PEGBOARD    Raw  T Drops   RH  70  45 0   LH 73  47 0    BDI-II  Raw:  22  Interpretation: Moderate    VICTOR M  Raw:  10  Interpretation: Mild    DCT E-score: 8

## 2025-02-11 NOTE — PROGRESS NOTES
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 She was unaccompanied to the evaluation.      BACKGROUND INFORMATION / INTERVIEW FINDINGS    Records indicate that Ms. Godoy's medical history includes generalized anxiety disorder, depression, anxiety, dysthymia, decreased libido, tubular adenoma of colon, dyspepsia, cervical intraepithelial neoplasia grade 3 with severe dysplasia, intramural leiomyoma of uterus, atypical melanocytic nevus, and bilateral knee pain.  She has expressed concerns about changes in cognition and specifically with memory.  The current evaluation was requested by Dr. Steph Pedraza, in this context.    On interview, Ms. Godoy confirmed the above history.  She stated that she began having concerns with her thinking circa 2018 or 2019.  She denied a trigger for onset at that time.  She noted that there was a step down in her cognition and subsequent stability.  She stated that her may have been additional steps downward in her thinking since that time.  She reported that she struggles with whether these issues are  in her head  or are neurologically based.  She noted the factors such as stress, going through menopause, and dealing with her ailing parents may be contributing.  She also began receiving treatment for mental health around age 48.  She wonders if some of the cognitive changes could be attributable to medication side effects.  Regarding specific cognitive concerns, she reported having difficulties with attention.  She stated that she does not feel present in conversations.  She struggles with focus.  She might forget information right after she hears it.  She reported that in the last 6 or 7 months, she has had more difficulties tracking conversations.  She also noted some difficulties  "with word finding.  She second-guesses her memory.  She noted that alcohol use exacerbates these cognitive issues.  Additionally, she described having an episode where she shiva a blank when speaking with a parent during a parent-teacher conference.  She does not feel as on top of it as she did in the past.  When asked about personality changes, she stated that she is more anxious in social situations than she was in the past.    With respect to mental health, Ms. Godoy reported that her mood is apathetic and \"blah.\"  As alluded to above, she first received treatment for mental health around age 48.  She stated that she was snappy with her  at the time and \"did not want to be so witchy.\"  She has been prescribed medications for both depression and anxiety.  These medications are prescribed by her primary care doctor.  She has never seen a psychiatrist.  She did speak with a counselor on 4 or 5 occasions over the telephone after her mother .  She is not currently working with a counselor.  She denied prior psychiatric hospitalization, hallucinations, symptoms consistent with severe mental illness, trauma, or abuse.  She reported that she had some passive thoughts of death in the past.  She denied current suicidal ideation.  She denied past suicide attempts.    Regarding other medical background, Ms. Godoy denied prior head injury, stroke, or seizure.  In terms of sleep, she uses Tylenol PM.  She has trouble falling asleep.  She began using zolpidem 3 to 4 years ago when she traveled.  She now uses 1 zolpidem pill every 2 months or so.  She reported that she is also starting to snore.  She talks in her sleep.  She denied symptoms of REM behavior disorder.  She denied pain or gross motor abnormalities.  She reported that she has had some changes in her vision with aging.  Per records, she has a current medication list which includes the following prescription(s): epinephrine, estradiol, fluoxetine, " fluoxetine, ibuprofen, magnesium, progesterone, sodium fluoride 5000 ppm, valacyclovir, and zolpidem.  She reported that she will occasionally consume an alcoholic drink.  She denied tobacco use.  She smoked in the past.  She denied illicit drug use.    Regarding family neurologic history, she reported that her father, who is now 86, has dementia.  Her mother had a stroke.  Her maternal grandmother had a stroke.  Her paternal grandfather had Alzheimer's disease.  She provides care for her father and also helped care for her mother.    Ms. Godoy lives at home with her .  One of their children lives with them now as well.  She manages her own basic daily activities and her own medications.  They share management of their finances.  Her  is primarily responsible for their meal preparation.  She drives.    By way of background, Ms. oGdoy and her  have been  for 30 years.  They have 2 adult daughters.  Regarding educational background, she graduated from high school with average grades.  She earned a bachelor's degree from [employer] in education.  She earned a master's degree in education from [place] [place].  Professionally, she has been a  for 31 years.  She works in the [email or web address].    BEHAVIORAL OBSERVATIONS  Ms. Godoy was polite and cooperative with the exam.  She engaged in limited spontaneous conversation with examiner.  Her speech was normal. Her comprehension was normal. Her thought processes were notable for mild carelessness/impulsivity on 1 measure. Her mood was mildly anxious and depressed with congruent affect. Her effort was good. The current results are felt to be an accurate depiction of her cognitive functioning.      RESULTS OF EXAM  Her performances on standardized measures of neuropsychological functioning were as follows:    She was fully oriented to time, place, and various aspects of personal information.   Performance on a measure of single word reading was average. She obtained passing scores on stand-alone and embedded metrics of cognitive performance validity.  Auditory attention for digits was exceptionally high.  Learning of words in a list format was average.  Delayed recall of list words was average.  Percent retention of list words was average.  Delayed recognition of list words was high average and performed without error.  Learning of simple geometric shapes and their spatial locations was exceptionally high.  Delayed recall of the shapes and their locations was above average.  Percent retention of the shapes was normal.  Delayed recognition the shapes was normal and performed without error.  Visual spatial judgments for variably oriented lines were average.  Visual problem-solving with blocks was average.  Her drawing of a complicated geometric figure was within normal limits despite a hurried approach.  Comprehension of phrases and short stories was average.  Verbal associative fluency was average.  Animal fluency was average.  Naming to confrontation was performed in the high average to above average range.  Verbal abstract reasoning was average.  Speeded visual sequencing under focused attention was exceptionally high.  A similar measure with a divided attention component was above average.  Speeded word reading was high average.  Speeded color naming was average.  Speeded inhibition of an overlearned response was performed in the low average to average range.  Speeded visual motor coding was above average.  Speeded fine motor dexterity was average, bilaterally.    She endorsed items consistent with moderate symptoms of depression and mild symptoms of anxiety on self-report measures.    IMPRESSIONS  Ms. Godoy demonstrated normal cognitive functioning across all assessed domains.  I do not see evidence to suggest acquired brain dysfunction.  She has a generally average range cognitive baseline, with  significant strengths in attention, cognitive speed, and aspects of executive functioning.  All other cognitive abilities, including memory, were performed in keeping with her baseline.  I suspect that psychological factors are contributing to her subjective concerns about cognitive dysfunction.  Specifically, she is reporting moderate symptoms of depression and mild symptoms of anxiety.  I would predict reduced cognitive concerns following improved management of her mental health.    RECOMMENDATIONS  Preliminary results and recommendations were provided to the patient on the date of the evaluation, and all questions were answered.     1.  Despite treatment, she remains depressed and anxious.  If medically indicated, consideration could be given to modified treatment of her mental health.  Consultation with a psychiatrist could be of benefit.    2. Along similar lines, referral for psychotherapy services is recommended.  One possible referral option would be [facility], with locations throughout the [place] area.  They can be reached by calling [phone].    3.  If she continues to have difficulties with memory, routine use of a memory notebook or other assistive device could be of benefit.    4. Follow-up neuropsychological evaluation could be considered in the future, if clinically indicated.     Raymundo Garcia, Ph.D., L.P., ABPP  Board Certified in Clinical Neuropsychology   / Licensed Psychologist [id number]    Time spent: One unit psychiatric diagnostic interview including interview, clinical assessment of thinking, reasoning, and judgment by licensed and board-certified neuropsychologist (CPT 54105). One unit (60 minutes) neuropsychological testing evaluation by licensed and board-certified neuropsychologist, including integration of patient data, interpretation of standardized test results and clinical data, clinical decision-making, treatment planning, report, and  interactive feedback to the patient, first hour (CPT 43510). One unit(s) (35 minutes) of neuropsychological testing evaluation by licensed and board-certified neuropsychologist, including integration of patient data, interpretation of standardized test results and clinical data, clinical decision-making, treatment planning, report, and interactive feedback to the patient, subsequent hours (CPT 29906). One unit (30 minutes) of psychological and neuropsychological test administration and scoring by technician, first 30 minutes (CPT 19523). Two units (66 minutes) psychological or neuropsychological test administration and scoring by technician, subsequent 30 minutes (CPT 04073). Diagnoses: F06.8, F33.1, F41.9.

## 2025-02-11 NOTE — LETTER
2025       RE: Kayley Godoy  : 1970   MRN: [id number]      Dear Colleague,    Thank you for referring your patient, Kayley Godoy, to the [facility] at [facility]. Please see a copy of my visit note below.    Patient was seen for neuropsychological evaluation at the request of Dr. Steph Pedraza, for the purposes of diagnostic clarification and treatment planning.  1 hrs 36 min of test administration and scoring were provided by this writer, Hawa Betancourt.  Please see Dr. Raymundo Garcia's report for a full interpretation of the findings.      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 She was unaccompanied to the evaluation.      BACKGROUND INFORMATION / INTERVIEW FINDINGS    Records indicate that Ms. Godoy's medical history includes generalized anxiety disorder, depression, anxiety, dysthymia, decreased libido, tubular adenoma of colon, dyspepsia, cervical intraepithelial neoplasia grade 3 with severe dysplasia, intramural leiomyoma of uterus, atypical melanocytic nevus, and bilateral knee pain.  She has expressed concerns about changes in cognition and specifically with memory.  The current evaluation was requested by Dr. Steph Pedraza, in this context.    On interview, Ms. Godoy confirmed the above history.  She stated that she began having concerns with her thinking circa 2018 or 2019.  She denied a trigger for onset at that time.  She noted that there was a step down in her cognition and subsequent stability.  She stated that her may have been additional steps downward in her thinking since that time.  She reported that she struggles with whether these issues are  in her head  or are neurologically based.  She noted the  "factors such as stress, going through menopause, and dealing with her ailing parents may be contributing.  She also began receiving treatment for mental health around age 48.  She wonders if some of the cognitive changes could be attributable to medication side effects.  Regarding specific cognitive concerns, she reported having difficulties with attention.  She stated that she does not feel present in conversations.  She struggles with focus.  She might forget information right after she hears it.  She reported that in the last 6 or 7 months, she has had more difficulties tracking conversations.  She also noted some difficulties with word finding.  She second-guesses her memory.  She noted that alcohol use exacerbates these cognitive issues.  Additionally, she described having an episode where she shiva a blank when speaking with a parent during a parent-teacher conference.  She does not feel as on top of it as she did in the past.  When asked about personality changes, she stated that she is more anxious in social situations than she was in the past.    With respect to mental health, Ms. Godoy reported that her mood is apathetic and \"blah.\"  As alluded to above, she first received treatment for mental health around age 48.  She stated that she was snappy with her  at the time and \"did not want to be so witchy.\"  She has been prescribed medications for both depression and anxiety.  These medications are prescribed by her primary care doctor.  She has never seen a psychiatrist.  She did speak with a counselor on 4 or 5 occasions over the telephone after her mother .  She is not currently working with a counselor.  She denied prior psychiatric hospitalization, hallucinations, symptoms consistent with severe mental illness, trauma, or abuse.  She reported that she had some passive thoughts of death in the past.  She denied current suicidal ideation.  She denied past suicide attempts.    Regarding other " medical background, Ms. Godoy denied prior head injury, stroke, or seizure.  In terms of sleep, she uses Tylenol PM.  She has trouble falling asleep.  She began using zolpidem 3 to 4 years ago when she traveled.  She now uses 1 zolpidem pill every 2 months or so.  She reported that she is also starting to snore.  She talks in her sleep.  She denied symptoms of REM behavior disorder.  She denied pain or gross motor abnormalities.  She reported that she has had some changes in her vision with aging.  Per records, she has a current medication list which includes the following prescription(s): epinephrine, estradiol, fluoxetine, fluoxetine, ibuprofen, magnesium, progesterone, sodium fluoride 5000 ppm, valacyclovir, and zolpidem.  She reported that she will occasionally consume an alcoholic drink.  She denied tobacco use.  She smoked in the past.  She denied illicit drug use.    Regarding family neurologic history, she reported that her father, who is now 86, has dementia.  Her mother had a stroke.  Her maternal grandmother had a stroke.  Her paternal grandfather had Alzheimer's disease.  She provides care for her father and also helped care for her mother.    Ms. Godoy lives at home with her .  One of their children lives with them now as well.  She manages her own basic daily activities and her own medications.  They share management of their finances.  Her  is primarily responsible for their meal preparation.  She drives.    By way of background, Ms. Godoy and her  have been  for 30 years.  They have 2 adult daughters.  Regarding educational background, she graduated from high school with average grades.  She earned a bachelor's degree from [employer] in education.  She earned a master's degree in education from [place].  Professionally, she has been a  for 31 years.  She works in the [employer].    BEHAVIORAL  OBSERVATIONS  Ms. Godoy was polite and cooperative with the exam.  She engaged in limited spontaneous conversation with examiner.  Her speech was normal. Her comprehension was normal. Her thought processes were notable for mild carelessness/impulsivity on 1 measure. Her mood was mildly anxious and depressed with congruent affect. Her effort was good. The current results are felt to be an accurate depiction of her cognitive functioning.      RESULTS OF EXAM  Her performances on standardized measures of neuropsychological functioning were as follows:    She was fully oriented to time, place, and various aspects of personal information.  Performance on a measure of single word reading was average. She obtained passing scores on stand-alone and embedded metrics of cognitive performance validity.  Auditory attention for digits was exceptionally high.  Learning of words in a list format was average.  Delayed recall of list words was average.  Percent retention of list words was average.  Delayed recognition of list words was high average and performed without error.  Learning of simple geometric shapes and their spatial locations was exceptionally high.  Delayed recall of the shapes and their locations was above average.  Percent retention of the shapes was normal.  Delayed recognition the shapes was normal and performed without error.  Visual spatial judgments for variably oriented lines were average.  Visual problem-solving with blocks was average.  Her drawing of a complicated geometric figure was within normal limits despite a hurried approach.  Comprehension of phrases and short stories was average.  Verbal associative fluency was average.  Animal fluency was average.  Naming to confrontation was performed in the high average to above average range.  Verbal abstract reasoning was average.  Speeded visual sequencing under focused attention was exceptionally high.  A similar measure with a divided attention  component was above average.  Speeded word reading was high average.  Speeded color naming was average.  Speeded inhibition of an overlearned response was performed in the low average to average range.  Speeded visual motor coding was above average.  Speeded fine motor dexterity was average, bilaterally.    She endorsed items consistent with moderate symptoms of depression and mild symptoms of anxiety on self-report measures.    IMPRESSIONS  Ms. Godoy demonstrated normal cognitive functioning across all assessed domains.  I do not see evidence to suggest acquired brain dysfunction.  She has a generally average range cognitive baseline, with significant strengths in attention, cognitive speed, and aspects of executive functioning.  All other cognitive abilities, including memory, were performed in keeping with her baseline.  I suspect that psychological factors are contributing to her subjective concerns about cognitive dysfunction.  Specifically, she is reporting moderate symptoms of depression and mild symptoms of anxiety.  I would predict reduced cognitive concerns following improved management of her mental health.    RECOMMENDATIONS  Preliminary results and recommendations were provided to the patient on the date of the evaluation, and all questions were answered.     1.  Despite treatment, she remains depressed and anxious.  If medically indicated, consideration could be given to modified treatment of her mental health.  Consultation with a psychiatrist could be of benefit.    2. Along similar lines, referral for psychotherapy services is recommended.  One possible referral option would be [facility], with locations throughout the [facility] area.  They can be reached by calling [phone].    3.  If she continues to have difficulties with memory, routine use of a memory notebook or other assistive device could be of benefit.    4. Follow-up neuropsychological evaluation could be considered in  the future, if clinically indicated.     Raymundo Garcia, Ph.D., L.P., ABPP  Board Certified in Clinical Neuropsychology   / Licensed Psychologist [id number]    Time spent: One unit psychiatric diagnostic interview including interview, clinical assessment of thinking, reasoning, and judgment by licensed and board-certified neuropsychologist (CPT 85107). One unit (60 minutes) neuropsychological testing evaluation by licensed and board-certified neuropsychologist, including integration of patient data, interpretation of standardized test results and clinical data, clinical decision-making, treatment planning, report, and interactive feedback to the patient, first hour (CPT 62831). One unit(s) (35 minutes) of neuropsychological testing evaluation by licensed and board-certified neuropsychologist, including integration of patient data, interpretation of standardized test results and clinical data, clinical decision-making, treatment planning, report, and interactive feedback to the patient, subsequent hours (CPT 69704). One unit (30 minutes) of psychological and neuropsychological test administration and scoring by technician, first 30 minutes (CPT 43527). Two units (66 minutes) psychological or neuropsychological test administration and scoring by technician, subsequent 30 minutes (CPT 65055). Diagnoses: F06.8, F33.1, F41.9.              Again, thank you for allowing me to participate in the care of your patient.      Sincerely,    Raymundo Garcia, PhD LP

## 2025-03-04 DIAGNOSIS — F33.1 MODERATE EPISODE OF RECURRENT MAJOR DEPRESSIVE DISORDER (H): ICD-10-CM

## 2025-03-04 DIAGNOSIS — F41.1 GAD (GENERALIZED ANXIETY DISORDER): ICD-10-CM

## 2025-03-05 RX ORDER — FLUOXETINE HYDROCHLORIDE 40 MG/1
40 CAPSULE ORAL DAILY
Qty: 30 CAPSULE | Refills: 0 | Status: SHIPPED | OUTPATIENT
Start: 2025-03-05

## 2025-03-09 ENCOUNTER — MYC REFILL (OUTPATIENT)
Dept: FAMILY MEDICINE | Facility: CLINIC | Age: 55
End: 2025-03-09
Payer: COMMERCIAL

## 2025-03-09 DIAGNOSIS — G47.00 INSOMNIA, UNSPECIFIED TYPE: ICD-10-CM

## 2025-03-10 RX ORDER — ZOLPIDEM TARTRATE 5 MG/1
5 TABLET ORAL
Qty: 10 TABLET | Refills: 0 | Status: SHIPPED | OUTPATIENT
Start: 2025-03-10

## 2025-03-31 ENCOUNTER — MYC REFILL (OUTPATIENT)
Dept: FAMILY MEDICINE | Facility: CLINIC | Age: 55
End: 2025-03-31
Payer: COMMERCIAL

## 2025-03-31 DIAGNOSIS — G47.00 INSOMNIA, UNSPECIFIED TYPE: ICD-10-CM

## 2025-04-01 RX ORDER — ZOLPIDEM TARTRATE 5 MG/1
5 TABLET ORAL
Qty: 10 TABLET | Refills: 0 | Status: SHIPPED | OUTPATIENT
Start: 2025-04-01

## 2025-04-07 ENCOUNTER — PATIENT OUTREACH (OUTPATIENT)
Dept: CARE COORDINATION | Facility: CLINIC | Age: 55
End: 2025-04-07
Payer: COMMERCIAL

## 2025-04-25 ENCOUNTER — MYC REFILL (OUTPATIENT)
Dept: FAMILY MEDICINE | Facility: CLINIC | Age: 55
End: 2025-04-25
Payer: COMMERCIAL

## 2025-04-25 DIAGNOSIS — G47.00 INSOMNIA, UNSPECIFIED TYPE: ICD-10-CM

## 2025-04-29 ENCOUNTER — MYC REFILL (OUTPATIENT)
Dept: FAMILY MEDICINE | Facility: CLINIC | Age: 55
End: 2025-04-29
Payer: COMMERCIAL

## 2025-04-29 DIAGNOSIS — F41.1 GAD (GENERALIZED ANXIETY DISORDER): ICD-10-CM

## 2025-04-29 DIAGNOSIS — F33.1 MODERATE EPISODE OF RECURRENT MAJOR DEPRESSIVE DISORDER (H): ICD-10-CM

## 2025-04-29 RX ORDER — ZOLPIDEM TARTRATE 5 MG/1
5 TABLET ORAL
Qty: 10 TABLET | Refills: 0 | Status: SHIPPED | OUTPATIENT
Start: 2025-04-29

## 2025-05-05 ENCOUNTER — PATIENT OUTREACH (OUTPATIENT)
Dept: CARE COORDINATION | Facility: CLINIC | Age: 55
End: 2025-05-05
Payer: COMMERCIAL

## 2025-05-13 DIAGNOSIS — G47.00 INSOMNIA, UNSPECIFIED TYPE: ICD-10-CM

## 2025-05-13 RX ORDER — ZOLPIDEM TARTRATE 5 MG/1
TABLET ORAL
Qty: 10 TABLET | Refills: 2 | Status: SHIPPED | OUTPATIENT
Start: 2025-05-13

## 2025-05-28 ENCOUNTER — MYC REFILL (OUTPATIENT)
Dept: FAMILY MEDICINE | Facility: CLINIC | Age: 55
End: 2025-05-28
Payer: COMMERCIAL

## 2025-05-28 DIAGNOSIS — G47.00 INSOMNIA, UNSPECIFIED TYPE: ICD-10-CM

## 2025-05-28 RX ORDER — ZOLPIDEM TARTRATE 5 MG/1
TABLET ORAL
Qty: 10 TABLET | Refills: 2 | OUTPATIENT
Start: 2025-05-28

## 2025-06-11 ENCOUNTER — RESULTS FOLLOW-UP (OUTPATIENT)
Dept: FAMILY MEDICINE | Facility: CLINIC | Age: 55
End: 2025-06-11

## 2025-06-11 ENCOUNTER — LAB (OUTPATIENT)
Dept: LAB | Facility: CLINIC | Age: 55
End: 2025-06-11
Payer: COMMERCIAL

## 2025-06-11 DIAGNOSIS — Z13.1 SCREENING FOR DIABETES MELLITUS: ICD-10-CM

## 2025-06-11 DIAGNOSIS — Z13.6 CARDIOVASCULAR SCREENING; LDL GOAL LESS THAN 160: ICD-10-CM

## 2025-06-11 LAB
CHOLEST SERPL-MCNC: 219 MG/DL
FASTING STATUS PATIENT QL REPORTED: YES
FASTING STATUS PATIENT QL REPORTED: YES
GLUCOSE SERPL-MCNC: 91 MG/DL (ref 70–99)
HDLC SERPL-MCNC: 55 MG/DL
LDLC SERPL CALC-MCNC: 144 MG/DL
NONHDLC SERPL-MCNC: 164 MG/DL
TRIGL SERPL-MCNC: 99 MG/DL

## 2025-06-11 PROCEDURE — 36415 COLL VENOUS BLD VENIPUNCTURE: CPT

## 2025-06-11 PROCEDURE — 80061 LIPID PANEL: CPT

## 2025-06-11 PROCEDURE — 82947 ASSAY GLUCOSE BLOOD QUANT: CPT

## 2025-06-17 ENCOUNTER — MYC REFILL (OUTPATIENT)
Dept: FAMILY MEDICINE | Facility: CLINIC | Age: 55
End: 2025-06-17
Payer: COMMERCIAL

## 2025-06-17 DIAGNOSIS — G47.00 INSOMNIA, UNSPECIFIED TYPE: ICD-10-CM

## 2025-06-19 RX ORDER — ZOLPIDEM TARTRATE 5 MG/1
5 TABLET ORAL
Qty: 10 TABLET | Refills: 2 | Status: SHIPPED | OUTPATIENT
Start: 2025-06-19

## 2025-07-07 ENCOUNTER — MYC REFILL (OUTPATIENT)
Dept: FAMILY MEDICINE | Facility: CLINIC | Age: 55
End: 2025-07-07
Payer: COMMERCIAL

## 2025-07-07 DIAGNOSIS — G47.00 INSOMNIA, UNSPECIFIED TYPE: ICD-10-CM

## 2025-07-08 RX ORDER — ZOLPIDEM TARTRATE 5 MG/1
5 TABLET ORAL
Qty: 10 TABLET | Refills: 2 | Status: SHIPPED | OUTPATIENT
Start: 2025-07-08

## 2025-07-12 ENCOUNTER — HEALTH MAINTENANCE LETTER (OUTPATIENT)
Age: 55
End: 2025-07-12

## 2025-07-22 ENCOUNTER — MYC REFILL (OUTPATIENT)
Dept: FAMILY MEDICINE | Facility: CLINIC | Age: 55
End: 2025-07-22
Payer: COMMERCIAL

## 2025-07-22 DIAGNOSIS — F33.1 MODERATE EPISODE OF RECURRENT MAJOR DEPRESSIVE DISORDER (H): ICD-10-CM

## 2025-07-22 DIAGNOSIS — G47.00 INSOMNIA, UNSPECIFIED TYPE: ICD-10-CM

## 2025-07-22 DIAGNOSIS — F41.1 GAD (GENERALIZED ANXIETY DISORDER): ICD-10-CM

## 2025-07-22 RX ORDER — ZOLPIDEM TARTRATE 5 MG/1
5 TABLET ORAL
Qty: 10 TABLET | Refills: 2 | Status: CANCELLED | OUTPATIENT
Start: 2025-07-22

## 2025-07-22 RX ORDER — FLUOXETINE HYDROCHLORIDE 40 MG/1
40 CAPSULE ORAL DAILY
Qty: 90 CAPSULE | Refills: 1 | Status: CANCELLED | OUTPATIENT
Start: 2025-07-22

## 2025-09-02 ENCOUNTER — MYC REFILL (OUTPATIENT)
Dept: FAMILY MEDICINE | Facility: CLINIC | Age: 55
End: 2025-09-02
Payer: COMMERCIAL

## 2025-09-02 DIAGNOSIS — G47.00 INSOMNIA, UNSPECIFIED TYPE: ICD-10-CM

## 2025-09-04 RX ORDER — ZOLPIDEM TARTRATE 5 MG/1
5 TABLET ORAL
Qty: 30 TABLET | Refills: 2 | Status: SHIPPED | OUTPATIENT
Start: 2025-09-04

## (undated) DEVICE — ENDO SNARE EXACTO COLD 9MM LOOP 2.4MMX230CM 00711115

## (undated) RX ORDER — PROPOFOL 10 MG/ML
INJECTION, EMULSION INTRAVENOUS
Status: DISPENSED
Start: 2024-05-06